# Patient Record
Sex: FEMALE | HISPANIC OR LATINO
[De-identification: names, ages, dates, MRNs, and addresses within clinical notes are randomized per-mention and may not be internally consistent; named-entity substitution may affect disease eponyms.]

---

## 2017-06-06 ENCOUNTER — CHARTING TRANS (OUTPATIENT)
Dept: OTHER | Age: 27
End: 2017-06-06

## 2017-06-06 ENCOUNTER — LAB SERVICES (OUTPATIENT)
Dept: OTHER | Age: 27
End: 2017-06-06

## 2017-06-06 LAB — RAPID STREP GROUP A: POSITIVE

## 2018-05-23 ENCOUNTER — CHARTING TRANS (OUTPATIENT)
Dept: OTHER | Age: 28
End: 2018-05-23

## 2018-05-23 ENCOUNTER — LAB SERVICES (OUTPATIENT)
Dept: OTHER | Age: 28
End: 2018-05-23

## 2018-05-23 LAB — RAPID STREP GROUP A: POSITIVE

## 2018-07-07 ENCOUNTER — CHARTING TRANS (OUTPATIENT)
Dept: OTHER | Age: 28
End: 2018-07-07

## 2018-07-07 ENCOUNTER — LAB SERVICES (OUTPATIENT)
Dept: OTHER | Age: 28
End: 2018-07-07

## 2018-07-07 LAB — RAPID STREP GROUP A: NORMAL

## 2018-07-10 LAB — CULTURE STREP GRP A (STTH) HL: NORMAL

## 2018-10-31 VITALS
TEMPERATURE: 98.8 F | WEIGHT: 123.31 LBS | RESPIRATION RATE: 20 BRPM | OXYGEN SATURATION: 99 % | HEART RATE: 82 BPM | DIASTOLIC BLOOD PRESSURE: 60 MMHG | SYSTOLIC BLOOD PRESSURE: 100 MMHG

## 2018-11-01 VITALS
TEMPERATURE: 99.2 F | RESPIRATION RATE: 18 BRPM | SYSTOLIC BLOOD PRESSURE: 100 MMHG | OXYGEN SATURATION: 99 % | WEIGHT: 120 LBS | HEIGHT: 65 IN | DIASTOLIC BLOOD PRESSURE: 78 MMHG | HEART RATE: 78 BPM | BODY MASS INDEX: 19.99 KG/M2

## 2021-01-05 ENCOUNTER — OFFICE VISIT (OUTPATIENT)
Dept: INTEGRATIVE MEDICINE | Facility: CLINIC | Age: 31
End: 2021-01-05

## 2021-01-05 VITALS
WEIGHT: 126.38 LBS | SYSTOLIC BLOOD PRESSURE: 92 MMHG | BODY MASS INDEX: 21.05 KG/M2 | DIASTOLIC BLOOD PRESSURE: 62 MMHG | HEIGHT: 65 IN | HEART RATE: 64 BPM | OXYGEN SATURATION: 99 %

## 2021-01-05 DIAGNOSIS — Z00.00 ROUTINE MEDICAL EXAM: Primary | ICD-10-CM

## 2021-01-05 PROCEDURE — 99385 PREV VISIT NEW AGE 18-39: CPT | Performed by: FAMILY MEDICINE

## 2021-01-05 PROCEDURE — 3074F SYST BP LT 130 MM HG: CPT | Performed by: FAMILY MEDICINE

## 2021-01-05 PROCEDURE — 3078F DIAST BP <80 MM HG: CPT | Performed by: FAMILY MEDICINE

## 2021-01-05 PROCEDURE — 3008F BODY MASS INDEX DOCD: CPT | Performed by: FAMILY MEDICINE

## 2021-01-05 NOTE — PATIENT INSTRUCTIONS
I have complete iva in the body's ability to heal and transform. The products and items listed below (the “Products”)  and their claims have not been evaluated by the Food and Drug Administration.  Dietary products are not intended to treat, prevent, m

## 2021-01-05 NOTE — ADDENDUM NOTE
Addended by: Marianela Euceda on: 1/5/2021 09:35 AM     Modules accepted: Orders, Level of Service Patient returned call and informed of below results and recommendations per Marycarmen DOVER NP.  Patient states she is feeling good now, denies cramping.  Patient encouraged to call with any additional questions or concerns. She verbalized understanding and appreciation for follow up call.  Encounter closed.

## 2021-01-05 NOTE — PROGRESS NOTES
Magnus Waller is a 26year old female. Patient presents with:  New Patient  Physical: w/pap and breast exam      HPI:     Living with boyfriend and son. Diet - meat, salads once per week, cooking at home, eating out less at home.   Coffee - 1 cup  Soda - 1 history. CURRENT MEDICATIONS:     No current outpatient medications on file.        SOCIAL HISTORY:   Social History    Socioeconomic History      Marital status: Single      Spouse name: Not on file      Number of children: Not on file      Years of edu 5' 5\" (1.651 m)       Physical Exam   Constitutional: She is oriented to person, place, and time and well-developed, well-nourished, and in no distress. No distress. HENT:   Head: Normocephalic and atraumatic.    Right Ear: External ear normal.   Left Ea products and items listed below (the “Products”)  and their claims have not been evaluated by the Food and Drug Administration. Dietary products are not intended to treat, prevent, mitigate or cure disease.  Ultimately, you must draw your own conclusion as

## 2021-01-09 ENCOUNTER — LAB ENCOUNTER (OUTPATIENT)
Dept: LAB | Facility: HOSPITAL | Age: 31
End: 2021-01-09
Attending: FAMILY MEDICINE
Payer: COMMERCIAL

## 2021-01-09 DIAGNOSIS — Z00.00 ROUTINE MEDICAL EXAM: ICD-10-CM

## 2021-01-09 LAB
ALBUMIN SERPL-MCNC: 4.1 G/DL (ref 3.4–5)
ALBUMIN/GLOB SERPL: 1.1 {RATIO} (ref 1–2)
ALP LIVER SERPL-CCNC: 77 U/L
ALT SERPL-CCNC: 19 U/L
ANION GAP SERPL CALC-SCNC: 4 MMOL/L (ref 0–18)
AST SERPL-CCNC: 16 U/L (ref 15–37)
BASOPHILS # BLD AUTO: 0.02 X10(3) UL (ref 0–0.2)
BASOPHILS NFR BLD AUTO: 0.4 %
BILIRUB SERPL-MCNC: 0.5 MG/DL (ref 0.1–2)
BUN BLD-MCNC: 14 MG/DL (ref 7–18)
BUN/CREAT SERPL: 19.4 (ref 10–20)
CALCIUM BLD-MCNC: 9.2 MG/DL (ref 8.5–10.1)
CHLORIDE SERPL-SCNC: 109 MMOL/L (ref 98–112)
CHOLEST SMN-MCNC: 146 MG/DL (ref ?–200)
CO2 SERPL-SCNC: 26 MMOL/L (ref 21–32)
CREAT BLD-MCNC: 0.72 MG/DL
DEPRECATED RDW RBC AUTO: 38.7 FL (ref 35.1–46.3)
EOSINOPHIL # BLD AUTO: 0.06 X10(3) UL (ref 0–0.7)
EOSINOPHIL NFR BLD AUTO: 1.1 %
ERYTHROCYTE [DISTWIDTH] IN BLOOD BY AUTOMATED COUNT: 12 % (ref 11–15)
EST. AVERAGE GLUCOSE BLD GHB EST-MCNC: 103 MG/DL (ref 68–126)
GLOBULIN PLAS-MCNC: 3.9 G/DL (ref 2.8–4.4)
GLUCOSE BLD-MCNC: 90 MG/DL (ref 70–99)
HBA1C MFR BLD HPLC: 5.2 % (ref ?–5.7)
HCT VFR BLD AUTO: 42.3 %
HDLC SERPL-MCNC: 51 MG/DL (ref 40–59)
HGB BLD-MCNC: 13.5 G/DL
IMM GRANULOCYTES # BLD AUTO: 0.02 X10(3) UL (ref 0–1)
IMM GRANULOCYTES NFR BLD: 0.4 %
LDLC SERPL CALC-MCNC: 82 MG/DL (ref ?–100)
LYMPHOCYTES # BLD AUTO: 2.15 X10(3) UL (ref 1–4)
LYMPHOCYTES NFR BLD AUTO: 37.9 %
M PROTEIN MFR SERPL ELPH: 8 G/DL (ref 6.4–8.2)
MCH RBC QN AUTO: 28.1 PG (ref 26–34)
MCHC RBC AUTO-ENTMCNC: 31.9 G/DL (ref 31–37)
MCV RBC AUTO: 87.9 FL
MONOCYTES # BLD AUTO: 0.48 X10(3) UL (ref 0.1–1)
MONOCYTES NFR BLD AUTO: 8.5 %
NEUTROPHILS # BLD AUTO: 2.95 X10 (3) UL (ref 1.5–7.7)
NEUTROPHILS # BLD AUTO: 2.95 X10(3) UL (ref 1.5–7.7)
NEUTROPHILS NFR BLD AUTO: 51.7 %
NONHDLC SERPL-MCNC: 95 MG/DL (ref ?–130)
OSMOLALITY SERPL CALC.SUM OF ELEC: 288 MOSM/KG (ref 275–295)
PATIENT FASTING Y/N/NP: YES
PATIENT FASTING Y/N/NP: YES
PLATELET # BLD AUTO: 277 10(3)UL (ref 150–450)
POTASSIUM SERPL-SCNC: 4.3 MMOL/L (ref 3.5–5.1)
RBC # BLD AUTO: 4.81 X10(6)UL
SODIUM SERPL-SCNC: 139 MMOL/L (ref 136–145)
TRIGL SERPL-MCNC: 65 MG/DL (ref 30–149)
TSI SER-ACNC: 1.29 MIU/ML (ref 0.36–3.74)
VLDLC SERPL CALC-MCNC: 13 MG/DL (ref 0–30)
WBC # BLD AUTO: 5.7 X10(3) UL (ref 4–11)

## 2021-01-09 PROCEDURE — 80061 LIPID PANEL: CPT

## 2021-01-09 PROCEDURE — 83036 HEMOGLOBIN GLYCOSYLATED A1C: CPT

## 2021-01-09 PROCEDURE — 85025 COMPLETE CBC W/AUTO DIFF WBC: CPT

## 2021-01-09 PROCEDURE — 36415 COLL VENOUS BLD VENIPUNCTURE: CPT

## 2021-01-09 PROCEDURE — 82306 VITAMIN D 25 HYDROXY: CPT

## 2021-01-09 PROCEDURE — 84443 ASSAY THYROID STIM HORMONE: CPT

## 2021-01-09 PROCEDURE — 80053 COMPREHEN METABOLIC PANEL: CPT

## 2021-01-11 LAB — 25(OH)D3 SERPL-MCNC: 13.7 NG/ML (ref 30–100)

## 2021-06-21 ENCOUNTER — OFFICE VISIT (OUTPATIENT)
Dept: FAMILY MEDICINE CLINIC | Facility: CLINIC | Age: 31
End: 2021-06-21

## 2021-06-21 VITALS
SYSTOLIC BLOOD PRESSURE: 90 MMHG | HEART RATE: 70 BPM | DIASTOLIC BLOOD PRESSURE: 62 MMHG | HEIGHT: 65 IN | BODY MASS INDEX: 20.99 KG/M2 | WEIGHT: 126 LBS

## 2021-06-21 DIAGNOSIS — Z12.4 ROUTINE CERVICAL SMEAR: Primary | ICD-10-CM

## 2021-06-21 DIAGNOSIS — Z31.69 PRE-CONCEPTION COUNSELING: ICD-10-CM

## 2021-06-21 PROCEDURE — 3008F BODY MASS INDEX DOCD: CPT | Performed by: NURSE PRACTITIONER

## 2021-06-21 PROCEDURE — 3074F SYST BP LT 130 MM HG: CPT | Performed by: NURSE PRACTITIONER

## 2021-06-21 PROCEDURE — 3078F DIAST BP <80 MM HG: CPT | Performed by: NURSE PRACTITIONER

## 2021-06-21 PROCEDURE — 88175 CYTOPATH C/V AUTO FLUID REDO: CPT | Performed by: NURSE PRACTITIONER

## 2021-06-21 PROCEDURE — 99213 OFFICE O/P EST LOW 20 MIN: CPT | Performed by: NURSE PRACTITIONER

## 2021-06-21 PROCEDURE — 87624 HPV HI-RISK TYP POOLED RSLT: CPT | Performed by: NURSE PRACTITIONER

## 2021-06-21 NOTE — PATIENT INSTRUCTIONS
The Range of Pap Test Results  When your Pap test is sent to the lab, the lab studies your cell samples and reports any abnormal cell changes. Your healthcare provider can discuss these changes with you.  In some cases, an abnormal Pap test is due to an i destroy or remove problem cells. (Reported as low-grade PRIMO or CHRISTOFER 1.)  · Moderate to severe dysplasia. Cells show precancerous changes. Or noninvasive cancer (carcinoma in situ) may be present. Treatment to destroy or remove problem cells is likely.  (Repo micrograms or a prenatal vitamin daily.   · Eat a healthy, well-balanced diet. · Exercise 3 or more times a week and at least 150 minutes weekly. · Get within 15 pounds of your ideal weight.   The first weeks of pregnancy are the most important time in a

## 2021-06-21 NOTE — PROGRESS NOTES
Chief Complaint:   Patient presents with:  Gyn Exam      HPI:   This is a 33year old female coming in for pap smear. Patient reports last pap was in 2017, normal per patient. Denies hx of abnormal pap.  LMP in early June, periods regular, no bothersome sym HCT 42.3 35.0 - 48.0 %    MCV 87.9 80.0 - 100.0 fL    MCH 28.1 26.0 - 34.0 pg    MCHC 31.9 31.0 - 37.0 g/dL    RDW-SD 38.7 35.1 - 46.3 fL    RDW 12.0 11.0 - 15.0 %    .0 150.0 - 450.0 10(3)uL    Neutrophil Absolute Prelim 2.95 1.50 - 7.70 x10 (3) uL 01/05/2021 (Exact Date)   BMI 20.97 kg/m²  Estimated body mass index is 20.97 kg/m² as calculated from the following:    Height as of this encounter: 5' 5\" (1.651 m). Weight as of this encounter: 126 lb (57.2 kg). Vital signs reviewed. Appears stated

## 2021-09-28 ENCOUNTER — OFFICE VISIT (OUTPATIENT)
Dept: INTEGRATIVE MEDICINE | Facility: CLINIC | Age: 31
End: 2021-09-28

## 2021-09-28 VITALS
DIASTOLIC BLOOD PRESSURE: 68 MMHG | SYSTOLIC BLOOD PRESSURE: 106 MMHG | HEIGHT: 65 IN | WEIGHT: 121.81 LBS | BODY MASS INDEX: 20.29 KG/M2 | OXYGEN SATURATION: 99 % | HEART RATE: 88 BPM

## 2021-09-28 DIAGNOSIS — B37.3 CANDIDA VAGINITIS: Primary | ICD-10-CM

## 2021-09-28 PROCEDURE — 3008F BODY MASS INDEX DOCD: CPT | Performed by: FAMILY MEDICINE

## 2021-09-28 PROCEDURE — 99213 OFFICE O/P EST LOW 20 MIN: CPT | Performed by: FAMILY MEDICINE

## 2021-09-28 PROCEDURE — 3078F DIAST BP <80 MM HG: CPT | Performed by: FAMILY MEDICINE

## 2021-09-28 PROCEDURE — 3074F SYST BP LT 130 MM HG: CPT | Performed by: FAMILY MEDICINE

## 2021-09-28 RX ORDER — FLUCONAZOLE 150 MG/1
TABLET ORAL
Qty: 2 TABLET | Refills: 0 | Status: SHIPPED | OUTPATIENT
Start: 2021-09-28 | End: 2021-12-02

## 2021-09-28 NOTE — PATIENT INSTRUCTIONS
I have complete iva in the body's ability to heal and transform. The products and items listed below (the “Products”)  and their claims have not been evaluated by the Food and Drug Administration.  Dietary products are not intended to treat, prevent, m coffee or less per day), 25 g of refined sugar per day or less. Eat dark chocolate when craving something sweet. ½ of their body weight in water per day or at least 70-80 oz of water per day.   Emphasize In moderation Avoid   NON-STARCHY VEGETABLES  Artich tea  Coffee  Diet & regular soda  Energy drinks  Fruit juices   Emphasize In Moderation Avoid   Vanessa  Oregano Paprika  Rosemary  Salt  Thyme  Turmeric  HEALTHY FATS & OILS  Coconut oil (virgin)  Flax oil  Olive oil  Sesame oil  NO-SUGAR SWEETENERS  Nathalia Mend

## 2021-09-28 NOTE — PROGRESS NOTES
Obinna Morin is a 33year old female. Patient presents with: Integrative Medicine Consult: 9 mo f/u      HPI:     Treated for BV in June 2021. Developed itching and white dc after metronidazole tx. Used monistat and her symptoms resolved.      Then her Living Expenses: Not on file  Food Insecurity:       Worried About [employer] in the Last Year: Not on file      Ran Out of Food in the Last Year: Not on file  Transportation Needs:       Lack of Transportation (Medical):  Not on file      Lack of nerve deficit. Gait: Gait is intact.    Psychiatric:         Mood and Affect: Affect normal.         ASSESSMENT AND PLAN:     Office Visit on 06/21/2021   Component Date Value Ref Range Status   • HPV High Risk 06/21/2021 Negative  Negative Final   • H 06/21/2021 10:17 AM          Ordering Location:     [address]  Received:            06/22/2021 10:38 AM                                 Mike Muñoz, Products, including, but not limited to its efficacy, benefits or outcomes. Patient agrees to contact his/her healthcare professional and stop use of Products should any reactions arise. Recommendations:     To support normal vaginal sun ongoing: (non-GMO)  Peas  Potatoes  Pumpkin  Yams  FRUITS  Apples  Apricots  Blackberries  Blueberries  Grapefruit  Oranges  Peach  Pears  Strawberries  Watermelon  GRAINS & PSEUDO-GRAINS  Amaranth  Black rice  Brown rice  Wild rice  RED MEATS  Beef  [employer] Seaweed  • Roasted Kale  • Fruit- in moderation  • Raw cut up veggies (with hummus)        Return if symptoms worsen or fail to improve. Patient affirmed understanding of plan and all questions were answered.      Samina Jovel,

## 2021-10-04 ENCOUNTER — TELEPHONE (OUTPATIENT)
Dept: OBGYN CLINIC | Facility: CLINIC | Age: 31
End: 2021-10-04

## 2021-10-04 NOTE — TELEPHONE ENCOUNTER
Pt calling to report +HPT and LMP of 8/28 and reports cycles every 28-29 days. Pt is on PNV. Pt informed that we have male and female providers and she will see all of them, throughout care. Pt informed her first appt is OBN and offered PC or in person.  Pt

## 2021-10-16 ENCOUNTER — NURSE ONLY (OUTPATIENT)
Dept: OBGYN CLINIC | Facility: CLINIC | Age: 31
End: 2021-10-16

## 2021-10-16 VITALS — BODY MASS INDEX: 20 KG/M2 | WEIGHT: 122 LBS

## 2021-10-16 DIAGNOSIS — Z32.00 PREGNANCY EXAMINATION OR TEST, PREGNANCY UNCONFIRMED: Primary | ICD-10-CM

## 2021-10-16 DIAGNOSIS — Z34.91 FIRST TRIMESTER PREGNANCY: ICD-10-CM

## 2021-10-16 RX ORDER — CHOLECALCIFEROL (VITAMIN D3) 25 MCG
1 TABLET,CHEWABLE ORAL DAILY
COMMUNITY

## 2021-10-16 NOTE — PROGRESS NOTES
Pt seen for OBN-PC appt today with no complaints. Normal PN labs, Hep C and qual labs ordered. Pt advised all labs must be completed and resulted prior to MD appt or appt will be cancelled.  Pt accepted first available appt on 11/1 with GALA. Pt advised ca family with:  Patient's age 28 years or older as of estimated date of delivery: No   Thalassemia ([facility], [facility], [facility], or  background):  MCV less than 80: No   Neural tube defect (Meningomyelocele, Spina bifida, or Anencephaly): No   Congenit

## 2021-10-27 ENCOUNTER — TELEPHONE (OUTPATIENT)
Dept: OBGYN CLINIC | Facility: CLINIC | Age: 31
End: 2021-10-27

## 2021-10-27 NOTE — TELEPHONE ENCOUNTER
Reminder message sent to pt to get lab work done 2-3 days prior to and if labs are not resulted by appt day she will be asked to r/s

## 2021-10-29 ENCOUNTER — LAB ENCOUNTER (OUTPATIENT)
Dept: LAB | Facility: HOSPITAL | Age: 31
End: 2021-10-29
Attending: OBSTETRICS & GYNECOLOGY
Payer: COMMERCIAL

## 2021-10-29 DIAGNOSIS — Z34.91 FIRST TRIMESTER PREGNANCY: ICD-10-CM

## 2021-10-29 DIAGNOSIS — Z32.00 PREGNANCY EXAMINATION OR TEST, PREGNANCY UNCONFIRMED: ICD-10-CM

## 2021-10-29 PROCEDURE — 86780 TREPONEMA PALLIDUM: CPT

## 2021-10-29 PROCEDURE — 36415 COLL VENOUS BLD VENIPUNCTURE: CPT

## 2021-10-29 PROCEDURE — 84703 CHORIONIC GONADOTROPIN ASSAY: CPT

## 2021-10-29 PROCEDURE — 87086 URINE CULTURE/COLONY COUNT: CPT

## 2021-10-29 PROCEDURE — 86803 HEPATITIS C AB TEST: CPT

## 2021-10-29 PROCEDURE — 87389 HIV-1 AG W/HIV-1&-2 AB AG IA: CPT

## 2021-10-29 PROCEDURE — 86901 BLOOD TYPING SEROLOGIC RH(D): CPT

## 2021-10-29 PROCEDURE — 86850 RBC ANTIBODY SCREEN: CPT

## 2021-10-29 PROCEDURE — 86900 BLOOD TYPING SEROLOGIC ABO: CPT

## 2021-10-29 PROCEDURE — 86762 RUBELLA ANTIBODY: CPT

## 2021-10-29 PROCEDURE — 87340 HEPATITIS B SURFACE AG IA: CPT

## 2021-10-29 PROCEDURE — 85025 COMPLETE CBC W/AUTO DIFF WBC: CPT

## 2021-11-01 ENCOUNTER — INITIAL PRENATAL (OUTPATIENT)
Dept: OBGYN CLINIC | Facility: CLINIC | Age: 31
End: 2021-11-01

## 2021-11-01 VITALS — BODY MASS INDEX: 21 KG/M2 | SYSTOLIC BLOOD PRESSURE: 104 MMHG | WEIGHT: 123.38 LBS | DIASTOLIC BLOOD PRESSURE: 62 MMHG

## 2021-11-01 DIAGNOSIS — Z34.81 ENCOUNTER FOR SUPERVISION OF OTHER NORMAL PREGNANCY IN FIRST TRIMESTER: Primary | ICD-10-CM

## 2021-11-01 PROCEDURE — 81002 URINALYSIS NONAUTO W/O SCOPE: CPT | Performed by: OBSTETRICS & GYNECOLOGY

## 2021-11-01 PROCEDURE — 90686 IIV4 VACC NO PRSV 0.5 ML IM: CPT | Performed by: OBSTETRICS & GYNECOLOGY

## 2021-11-01 PROCEDURE — 3074F SYST BP LT 130 MM HG: CPT | Performed by: OBSTETRICS & GYNECOLOGY

## 2021-11-01 PROCEDURE — 90471 IMMUNIZATION ADMIN: CPT | Performed by: OBSTETRICS & GYNECOLOGY

## 2021-11-01 PROCEDURE — 3078F DIAST BP <80 MM HG: CPT | Performed by: OBSTETRICS & GYNECOLOGY

## 2021-11-01 NOTE — PROGRESS NOTES
Roberth at visit. 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 Been together since teens. 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 Referred by Lora SILVA. They decline FTS. Flu vaccine today. US = dates today. Daily nausea.

## 2021-12-02 ENCOUNTER — ROUTINE PRENATAL (OUTPATIENT)
Dept: OBGYN CLINIC | Facility: CLINIC | Age: 31
End: 2021-12-02

## 2021-12-02 VITALS
WEIGHT: 124.63 LBS | DIASTOLIC BLOOD PRESSURE: 68 MMHG | SYSTOLIC BLOOD PRESSURE: 107 MMHG | HEART RATE: 82 BPM | BODY MASS INDEX: 21 KG/M2

## 2021-12-02 DIAGNOSIS — Z34.81 ENCOUNTER FOR SUPERVISION OF OTHER NORMAL PREGNANCY IN FIRST TRIMESTER: Primary | ICD-10-CM

## 2021-12-02 PROCEDURE — 81002 URINALYSIS NONAUTO W/O SCOPE: CPT | Performed by: OBSTETRICS & GYNECOLOGY

## 2021-12-02 PROCEDURE — 3078F DIAST BP <80 MM HG: CPT | Performed by: OBSTETRICS & GYNECOLOGY

## 2021-12-02 PROCEDURE — 3074F SYST BP LT 130 MM HG: CPT | Performed by: OBSTETRICS & GYNECOLOGY

## 2021-12-27 ENCOUNTER — ROUTINE PRENATAL (OUTPATIENT)
Dept: OBGYN CLINIC | Facility: CLINIC | Age: 31
End: 2021-12-27

## 2021-12-27 VITALS
WEIGHT: 122 LBS | DIASTOLIC BLOOD PRESSURE: 63 MMHG | BODY MASS INDEX: 20 KG/M2 | SYSTOLIC BLOOD PRESSURE: 101 MMHG | HEART RATE: 90 BPM

## 2021-12-27 DIAGNOSIS — Z34.92 ENCOUNTER FOR SUPERVISION OF NORMAL PREGNANCY IN SECOND TRIMESTER, UNSPECIFIED GRAVIDITY: Primary | ICD-10-CM

## 2021-12-27 PROCEDURE — 81002 URINALYSIS NONAUTO W/O SCOPE: CPT | Performed by: OBSTETRICS & GYNECOLOGY

## 2021-12-27 PROCEDURE — 3074F SYST BP LT 130 MM HG: CPT | Performed by: OBSTETRICS & GYNECOLOGY

## 2021-12-27 PROCEDURE — 3078F DIAST BP <80 MM HG: CPT | Performed by: OBSTETRICS & GYNECOLOGY

## 2022-01-18 ENCOUNTER — HOSPITAL ENCOUNTER (OUTPATIENT)
Dept: ULTRASOUND IMAGING | Age: 32
Discharge: HOME | End: 2022-01-18
Attending: OBSTETRICS & GYNECOLOGY
Payer: COMMERCIAL

## 2022-01-18 DIAGNOSIS — Z34.92 ENCOUNTER FOR SUPERVISION OF NORMAL PREGNANCY IN SECOND TRIMESTER, UNSPECIFIED GRAVIDITY: ICD-10-CM

## 2022-01-18 PROCEDURE — 76805 OB US >/= 14 WKS SNGL FETUS: CPT | Performed by: OBSTETRICS & GYNECOLOGY

## 2022-01-25 ENCOUNTER — ROUTINE PRENATAL (OUTPATIENT)
Dept: OBGYN CLINIC | Facility: CLINIC | Age: 32
End: 2022-01-25
Payer: COMMERCIAL

## 2022-01-25 VITALS
DIASTOLIC BLOOD PRESSURE: 65 MMHG | HEART RATE: 77 BPM | BODY MASS INDEX: 21 KG/M2 | SYSTOLIC BLOOD PRESSURE: 103 MMHG | WEIGHT: 128.38 LBS

## 2022-01-25 DIAGNOSIS — Z34.92 ENCOUNTER FOR SUPERVISION OF NORMAL PREGNANCY IN SECOND TRIMESTER, UNSPECIFIED GRAVIDITY: Primary | ICD-10-CM

## 2022-01-25 PROBLEM — O26.899 RH NEGATIVE STATE IN ANTEPARTUM PERIOD (HCC): Status: ACTIVE | Noted: 2022-01-25

## 2022-01-25 PROBLEM — Z67.91 RH NEGATIVE STATE IN ANTEPARTUM PERIOD: Status: ACTIVE | Noted: 2022-01-25

## 2022-01-25 PROBLEM — O26.899 RH NEGATIVE STATE IN ANTEPARTUM PERIOD: Status: ACTIVE | Noted: 2022-01-25

## 2022-01-25 PROBLEM — Z67.91 RH NEGATIVE STATE IN ANTEPARTUM PERIOD (HCC): Status: ACTIVE | Noted: 2022-01-25

## 2022-01-25 LAB
APPEARANCE: CLEAR
BILIRUBIN: NEGATIVE
GLUCOSE (URINE DIPSTICK): NEGATIVE MG/DL
KETONES (URINE DIPSTICK): NEGATIVE MG/DL
MULTISTIX LOT#: ABNORMAL NUMERIC
NITRITE, URINE: NEGATIVE
OCCULT BLOOD: NEGATIVE
PH, URINE: 6.5 (ref 4.5–8)
PROTEIN (URINE DIPSTICK): NEGATIVE MG/DL
SPECIFIC GRAVITY: 1.02 (ref 1–1.03)
URINE-COLOR: YELLOW
UROBILINOGEN,SEMI-QN: 0.2 MG/DL (ref 0–1.9)

## 2022-01-25 PROCEDURE — 81002 URINALYSIS NONAUTO W/O SCOPE: CPT | Performed by: OBSTETRICS & GYNECOLOGY

## 2022-01-25 PROCEDURE — 3074F SYST BP LT 130 MM HG: CPT | Performed by: OBSTETRICS & GYNECOLOGY

## 2022-01-25 PROCEDURE — 3078F DIAST BP <80 MM HG: CPT | Performed by: OBSTETRICS & GYNECOLOGY

## 2022-02-21 ENCOUNTER — ROUTINE PRENATAL (OUTPATIENT)
Dept: OBGYN CLINIC | Facility: CLINIC | Age: 32
End: 2022-02-21
Payer: COMMERCIAL

## 2022-02-21 VITALS
BODY MASS INDEX: 22 KG/M2 | DIASTOLIC BLOOD PRESSURE: 62 MMHG | HEART RATE: 79 BPM | SYSTOLIC BLOOD PRESSURE: 101 MMHG | WEIGHT: 134.63 LBS

## 2022-02-21 DIAGNOSIS — Z34.82 ENCOUNTER FOR SUPERVISION OF OTHER NORMAL PREGNANCY IN SECOND TRIMESTER: Primary | ICD-10-CM

## 2022-02-21 LAB
BILIRUBIN: NEGATIVE
GLUCOSE (URINE DIPSTICK): NEGATIVE MG/DL
KETONES (URINE DIPSTICK): NEGATIVE MG/DL
MULTISTIX EXPIRATION DATE: 3622 DATE
MULTISTIX LOT#: 1027 NUMERIC
NITRITE, URINE: NEGATIVE
OCCULT BLOOD: NEGATIVE
PH, URINE: 6.5 (ref 4.5–8)
SPECIFIC GRAVITY: 1.02 (ref 1–1.03)
UROBILINOGEN,SEMI-QN: 0.2 MG/DL (ref 0–1.9)

## 2022-02-21 PROCEDURE — 81002 URINALYSIS NONAUTO W/O SCOPE: CPT | Performed by: OBSTETRICS & GYNECOLOGY

## 2022-02-21 PROCEDURE — 3074F SYST BP LT 130 MM HG: CPT | Performed by: OBSTETRICS & GYNECOLOGY

## 2022-02-21 PROCEDURE — 3078F DIAST BP <80 MM HG: CPT | Performed by: OBSTETRICS & GYNECOLOGY

## 2022-02-28 ENCOUNTER — TELEPHONE (OUTPATIENT)
Dept: OBGYN CLINIC | Facility: CLINIC | Age: 32
End: 2022-02-28

## 2022-02-28 NOTE — TELEPHONE ENCOUNTER
]Pt stated  she is getting labs done this weekend and thought she needed repeat aboRH and antibody screen since pt will need rhogam. Pt informed she is correct an aboRH and antibody screen labs will be placed now. Pt verbalized understanding.

## 2022-03-05 ENCOUNTER — LAB ENCOUNTER (OUTPATIENT)
Dept: LAB | Facility: HOSPITAL | Age: 32
End: 2022-03-05
Attending: FAMILY MEDICINE
Payer: COMMERCIAL

## 2022-03-05 DIAGNOSIS — Z34.82 ENCOUNTER FOR SUPERVISION OF OTHER NORMAL PREGNANCY IN SECOND TRIMESTER: ICD-10-CM

## 2022-03-05 DIAGNOSIS — Z34.81 ENCOUNTER FOR SUPERVISION OF OTHER NORMAL PREGNANCY IN FIRST TRIMESTER: ICD-10-CM

## 2022-03-05 DIAGNOSIS — Z67.91 BLOOD TYPE, RH NEGATIVE: ICD-10-CM

## 2022-03-05 LAB
ANTIBODY SCREEN: NEGATIVE
DEPRECATED RDW RBC AUTO: 39.9 FL (ref 35.1–46.3)
ERYTHROCYTE [DISTWIDTH] IN BLOOD BY AUTOMATED COUNT: 12.1 % (ref 11–15)
GLUCOSE 1H P GLC SERPL-MCNC: 127 MG/DL
HCT VFR BLD AUTO: 34.9 %
HGB BLD-MCNC: 11.2 G/DL
MCH RBC QN AUTO: 29 PG (ref 26–34)
MCHC RBC AUTO-ENTMCNC: 32.1 G/DL (ref 31–37)
MCV RBC AUTO: 90.4 FL
PLATELET # BLD AUTO: 246 10(3)UL (ref 150–450)
RBC # BLD AUTO: 3.86 X10(6)UL
RH BLOOD TYPE: NEGATIVE
WBC # BLD AUTO: 8.5 X10(3) UL (ref 4–11)

## 2022-03-05 PROCEDURE — 86901 BLOOD TYPING SEROLOGIC RH(D): CPT

## 2022-03-05 PROCEDURE — 86900 BLOOD TYPING SEROLOGIC ABO: CPT

## 2022-03-05 PROCEDURE — 86850 RBC ANTIBODY SCREEN: CPT

## 2022-03-05 PROCEDURE — 36415 COLL VENOUS BLD VENIPUNCTURE: CPT

## 2022-03-05 PROCEDURE — 85027 COMPLETE CBC AUTOMATED: CPT

## 2022-03-05 PROCEDURE — 82950 GLUCOSE TEST: CPT

## 2022-03-14 ENCOUNTER — ROUTINE PRENATAL (OUTPATIENT)
Dept: OBGYN CLINIC | Facility: CLINIC | Age: 32
End: 2022-03-14
Payer: COMMERCIAL

## 2022-03-14 VITALS
BODY MASS INDEX: 23 KG/M2 | SYSTOLIC BLOOD PRESSURE: 103 MMHG | WEIGHT: 136 LBS | HEART RATE: 76 BPM | DIASTOLIC BLOOD PRESSURE: 64 MMHG

## 2022-03-14 DIAGNOSIS — Z67.91 RH NEGATIVE STATUS DURING PREGNANCY IN THIRD TRIMESTER: ICD-10-CM

## 2022-03-14 DIAGNOSIS — Z34.83 ENCOUNTER FOR SUPERVISION OF OTHER NORMAL PREGNANCY IN THIRD TRIMESTER: Primary | ICD-10-CM

## 2022-03-14 DIAGNOSIS — O26.893 RH NEGATIVE STATUS DURING PREGNANCY IN THIRD TRIMESTER: ICD-10-CM

## 2022-03-14 LAB
APPEARANCE: CLEAR
GLUCOSE (URINE DIPSTICK): NEGATIVE MG/DL
MULTISTIX LOT#: NORMAL NUMERIC
NITRITE, URINE: NEGATIVE
URINE-COLOR: YELLOW

## 2022-03-14 PROCEDURE — 96372 THER/PROPH/DIAG INJ SC/IM: CPT | Performed by: OBSTETRICS & GYNECOLOGY

## 2022-03-14 PROCEDURE — 81002 URINALYSIS NONAUTO W/O SCOPE: CPT | Performed by: OBSTETRICS & GYNECOLOGY

## 2022-03-14 PROCEDURE — 3078F DIAST BP <80 MM HG: CPT | Performed by: OBSTETRICS & GYNECOLOGY

## 2022-03-14 PROCEDURE — 3074F SYST BP LT 130 MM HG: CPT | Performed by: OBSTETRICS & GYNECOLOGY

## 2022-03-14 NOTE — PROGRESS NOTES
rhogam administered to pts left upper outer gluteus. pt tolerated injection well. Rhogam card given to pt.

## 2022-03-30 ENCOUNTER — ROUTINE PRENATAL (OUTPATIENT)
Dept: OBGYN CLINIC | Facility: CLINIC | Age: 32
End: 2022-03-30
Payer: COMMERCIAL

## 2022-03-30 VITALS
WEIGHT: 140.63 LBS | BODY MASS INDEX: 23 KG/M2 | HEART RATE: 81 BPM | SYSTOLIC BLOOD PRESSURE: 105 MMHG | DIASTOLIC BLOOD PRESSURE: 64 MMHG

## 2022-03-30 DIAGNOSIS — Z34.83 ENCOUNTER FOR SUPERVISION OF OTHER NORMAL PREGNANCY IN THIRD TRIMESTER: Primary | ICD-10-CM

## 2022-03-30 LAB
APPEARANCE: CLEAR
BILIRUBIN: NEGATIVE
GLUCOSE (URINE DIPSTICK): NEGATIVE MG/DL
KETONES (URINE DIPSTICK): NEGATIVE MG/DL
MULTISTIX LOT#: ABNORMAL NUMERIC
NITRITE, URINE: NEGATIVE
OCCULT BLOOD: NEGATIVE
PH, URINE: 5 (ref 4.5–8)
PROTEIN (URINE DIPSTICK): NEGATIVE MG/DL
SPECIFIC GRAVITY: 1.01 (ref 1–1.03)
URINE-COLOR: YELLOW
UROBILINOGEN,SEMI-QN: 0.2 MG/DL (ref 0–1.9)

## 2022-03-30 PROCEDURE — 3074F SYST BP LT 130 MM HG: CPT | Performed by: OBSTETRICS & GYNECOLOGY

## 2022-03-30 PROCEDURE — 81002 URINALYSIS NONAUTO W/O SCOPE: CPT | Performed by: OBSTETRICS & GYNECOLOGY

## 2022-03-30 PROCEDURE — 90715 TDAP VACCINE 7 YRS/> IM: CPT | Performed by: OBSTETRICS & GYNECOLOGY

## 2022-03-30 PROCEDURE — 3078F DIAST BP <80 MM HG: CPT | Performed by: OBSTETRICS & GYNECOLOGY

## 2022-03-30 PROCEDURE — 90471 IMMUNIZATION ADMIN: CPT | Performed by: OBSTETRICS & GYNECOLOGY

## 2022-04-11 ENCOUNTER — ROUTINE PRENATAL (OUTPATIENT)
Dept: OBGYN CLINIC | Facility: CLINIC | Age: 32
End: 2022-04-11
Payer: COMMERCIAL

## 2022-04-11 VITALS
SYSTOLIC BLOOD PRESSURE: 101 MMHG | DIASTOLIC BLOOD PRESSURE: 65 MMHG | HEART RATE: 82 BPM | BODY MASS INDEX: 24 KG/M2 | WEIGHT: 141.81 LBS

## 2022-04-11 DIAGNOSIS — Z34.83 ENCOUNTER FOR SUPERVISION OF OTHER NORMAL PREGNANCY IN THIRD TRIMESTER: Primary | ICD-10-CM

## 2022-04-11 PROCEDURE — 3078F DIAST BP <80 MM HG: CPT | Performed by: OBSTETRICS & GYNECOLOGY

## 2022-04-11 PROCEDURE — 3074F SYST BP LT 130 MM HG: CPT | Performed by: OBSTETRICS & GYNECOLOGY

## 2022-04-20 ENCOUNTER — TELEPHONE (OUTPATIENT)
Dept: OBGYN CLINIC | Facility: CLINIC | Age: 32
End: 2022-04-20

## 2022-04-20 NOTE — TELEPHONE ENCOUNTER
Message to [facility] EMERGENC[facility] on call now to please advise is pt needs MFM consult for +covid? Pt is 33w3d.

## 2022-04-20 NOTE — TELEPHONE ENCOUNTER
All pts getting Ultrasounds with MFM must have consults    Order placed,mychart sent, MFM number provided

## 2022-04-20 NOTE — TELEPHONE ENCOUNTER
Pt is 33w3d, tested + Covid on Monday 4/18 with home test. Also tested Covid + on 4/19 at work ([address]). Reports symptoms started on 4/17, having scratchy throat, body aches, chills, cough, runny nose and temp 100.1. Denies difficulty breathing and SOB. Reports her son was exposed at school last week and son also tested positive this week. BMI =20. Advised pt on quarantine, comfort measures and advised okay to take Tylenol PRN as directed on package. Pt has next PN appt on 4/28, advised okay to keep appt as is because will be out of 10 day quarantine. Instructed to call clinic if still having symptoms the day before appt. Pt will forward copy of +covid test via e-Tag. Message to [facility] on call to ask if pt needs MFM consult?

## 2022-04-21 NOTE — TELEPHONE ENCOUNTER
Spoke to pt. Aware growth order has been placed,provided MFM number to schedule appt.  Aware must call now, since they book up quickly due to them being specialty

## 2022-04-28 ENCOUNTER — ROUTINE PRENATAL (OUTPATIENT)
Dept: OBGYN CLINIC | Facility: CLINIC | Age: 32
End: 2022-04-28
Payer: COMMERCIAL

## 2022-04-28 VITALS
BODY MASS INDEX: 24 KG/M2 | WEIGHT: 143.19 LBS | HEART RATE: 89 BPM | DIASTOLIC BLOOD PRESSURE: 64 MMHG | SYSTOLIC BLOOD PRESSURE: 102 MMHG

## 2022-04-28 DIAGNOSIS — Z34.83 ENCOUNTER FOR SUPERVISION OF OTHER NORMAL PREGNANCY IN THIRD TRIMESTER: Primary | ICD-10-CM

## 2022-04-28 PROBLEM — U07.1 COVID-19 AFFECTING PREGNANCY IN THIRD TRIMESTER: Status: ACTIVE | Noted: 2022-04-28

## 2022-04-28 PROBLEM — U07.1 COVID-19 AFFECTING PREGNANCY IN THIRD TRIMESTER (HCC): Status: ACTIVE | Noted: 2022-04-28

## 2022-04-28 PROBLEM — O98.513 COVID-19 AFFECTING PREGNANCY IN THIRD TRIMESTER: Status: ACTIVE | Noted: 2022-04-28

## 2022-04-28 PROBLEM — O98.513 COVID-19 AFFECTING PREGNANCY IN THIRD TRIMESTER (HCC): Status: ACTIVE | Noted: 2022-04-28

## 2022-04-28 LAB
APPEARANCE: CLEAR
BILIRUBIN: NEGATIVE
GLUCOSE (URINE DIPSTICK): NEGATIVE MG/DL
KETONES (URINE DIPSTICK): NEGATIVE MG/DL
MULTISTIX LOT#: ABNORMAL NUMERIC
NITRITE, URINE: NEGATIVE
OCCULT BLOOD: NEGATIVE
PH, URINE: 6 (ref 4.5–8)
PROTEIN (URINE DIPSTICK): NEGATIVE MG/DL
SPECIFIC GRAVITY: 1.03 (ref 1–1.03)
URINE-COLOR: YELLOW
UROBILINOGEN,SEMI-QN: 0.2 MG/DL (ref 0–1.9)

## 2022-04-28 PROCEDURE — 3074F SYST BP LT 130 MM HG: CPT | Performed by: OBSTETRICS & GYNECOLOGY

## 2022-04-28 PROCEDURE — 81002 URINALYSIS NONAUTO W/O SCOPE: CPT | Performed by: OBSTETRICS & GYNECOLOGY

## 2022-04-28 PROCEDURE — 3078F DIAST BP <80 MM HG: CPT | Performed by: OBSTETRICS & GYNECOLOGY

## 2022-05-10 ENCOUNTER — LAB ENCOUNTER (OUTPATIENT)
Dept: LAB | Facility: HOSPITAL | Age: 32
End: 2022-05-10
Attending: OBSTETRICS & GYNECOLOGY
Payer: COMMERCIAL

## 2022-05-10 DIAGNOSIS — Z34.83 ENCOUNTER FOR SUPERVISION OF OTHER NORMAL PREGNANCY IN THIRD TRIMESTER: ICD-10-CM

## 2022-05-10 LAB
DEPRECATED RDW RBC AUTO: 38.5 FL (ref 35.1–46.3)
ERYTHROCYTE [DISTWIDTH] IN BLOOD BY AUTOMATED COUNT: 12 % (ref 11–15)
HCT VFR BLD AUTO: 35.8 %
HGB BLD-MCNC: 11.2 G/DL
MCH RBC QN AUTO: 27.9 PG (ref 26–34)
MCHC RBC AUTO-ENTMCNC: 31.3 G/DL (ref 31–37)
MCV RBC AUTO: 89.1 FL
PLATELET # BLD AUTO: 253 10(3)UL (ref 150–450)
RBC # BLD AUTO: 4.02 X10(6)UL
WBC # BLD AUTO: 10.4 X10(3) UL (ref 4–11)

## 2022-05-10 PROCEDURE — 86780 TREPONEMA PALLIDUM: CPT

## 2022-05-10 PROCEDURE — 85027 COMPLETE CBC AUTOMATED: CPT

## 2022-05-10 PROCEDURE — 87389 HIV-1 AG W/HIV-1&-2 AB AG IA: CPT

## 2022-05-10 PROCEDURE — 36415 COLL VENOUS BLD VENIPUNCTURE: CPT

## 2022-05-11 LAB — T PALLIDUM AB SER QL: NEGATIVE

## 2022-05-13 ENCOUNTER — ROUTINE PRENATAL (OUTPATIENT)
Dept: OBGYN CLINIC | Facility: CLINIC | Age: 32
End: 2022-05-13
Payer: COMMERCIAL

## 2022-05-13 VITALS
SYSTOLIC BLOOD PRESSURE: 101 MMHG | HEART RATE: 76 BPM | BODY MASS INDEX: 24 KG/M2 | WEIGHT: 147 LBS | DIASTOLIC BLOOD PRESSURE: 62 MMHG

## 2022-05-13 DIAGNOSIS — Z34.93 ENCOUNTER FOR SUPERVISION OF NORMAL PREGNANCY IN THIRD TRIMESTER, UNSPECIFIED GRAVIDITY: Primary | ICD-10-CM

## 2022-05-13 PROCEDURE — 3078F DIAST BP <80 MM HG: CPT | Performed by: OBSTETRICS & GYNECOLOGY

## 2022-05-13 PROCEDURE — 3074F SYST BP LT 130 MM HG: CPT | Performed by: OBSTETRICS & GYNECOLOGY

## 2022-05-14 LAB — GROUP B STREP BY PCR FOR PCR OVT: NEGATIVE

## 2022-05-16 ENCOUNTER — TELEPHONE (OUTPATIENT)
Dept: OBGYN CLINIC | Facility: CLINIC | Age: 32
End: 2022-05-16

## 2022-05-16 NOTE — TELEPHONE ENCOUNTER
Pt had an appt on 5/13 with GALA.  States after her visit she did her urine sample. She states she gave it to an MA or RN. Pt states the sample was labeled. It was not the MA that roomed her. Pt states that person said they would give the sample to GALA's team.  Results from the urine dip are not in pts chart. Pt would like results from urine dip. MA that worked with GALA on Friday is not here today. Message to Supervisor.

## 2022-05-17 ENCOUNTER — HOSPITAL ENCOUNTER (OUTPATIENT)
Dept: PERINATAL CARE | Facility: HOSPITAL | Age: 32
Discharge: HOME | End: 2022-05-17
Attending: OBSTETRICS & GYNECOLOGY
Payer: COMMERCIAL

## 2022-05-17 VITALS
BODY MASS INDEX: 24 KG/M2 | WEIGHT: 147 LBS | DIASTOLIC BLOOD PRESSURE: 53 MMHG | HEART RATE: 72 BPM | SYSTOLIC BLOOD PRESSURE: 103 MMHG

## 2022-05-17 DIAGNOSIS — O98.513 COVID-19 AFFECTING PREGNANCY IN THIRD TRIMESTER: Primary | ICD-10-CM

## 2022-05-17 DIAGNOSIS — O98.513 COVID-19 AFFECTING PREGNANCY IN THIRD TRIMESTER: ICD-10-CM

## 2022-05-17 DIAGNOSIS — U07.1 COVID-19 AFFECTING PREGNANCY IN THIRD TRIMESTER: Primary | ICD-10-CM

## 2022-05-17 DIAGNOSIS — U07.1 COVID-19 AFFECTING PREGNANCY IN THIRD TRIMESTER: ICD-10-CM

## 2022-05-17 PROCEDURE — 76819 FETAL BIOPHYS PROFIL W/O NST: CPT

## 2022-05-17 PROCEDURE — 76816 OB US FOLLOW-UP PER FETUS: CPT | Performed by: OBSTETRICS & GYNECOLOGY

## 2022-05-20 ENCOUNTER — ROUTINE PRENATAL (OUTPATIENT)
Dept: OBGYN CLINIC | Facility: CLINIC | Age: 32
End: 2022-05-20
Payer: COMMERCIAL

## 2022-05-20 VITALS
DIASTOLIC BLOOD PRESSURE: 58 MMHG | BODY MASS INDEX: 24 KG/M2 | SYSTOLIC BLOOD PRESSURE: 101 MMHG | WEIGHT: 145.38 LBS | HEART RATE: 65 BPM

## 2022-05-20 DIAGNOSIS — Z34.83 ENCOUNTER FOR SUPERVISION OF OTHER NORMAL PREGNANCY IN THIRD TRIMESTER: Primary | ICD-10-CM

## 2022-05-20 LAB
APPEARANCE: CLEAR
BILIRUBIN: NEGATIVE
GLUCOSE (URINE DIPSTICK): NEGATIVE MG/DL
KETONES (URINE DIPSTICK): NEGATIVE MG/DL
LEUKOCYTES: NEGATIVE
MULTISTIX EXPIRATION DATE: 6 6 22 DATE
MULTISTIX LOT#: NORMAL NUMERIC
NITRITE, URINE: NEGATIVE
OCCULT BLOOD: NEGATIVE
PH, URINE: 6 (ref 4.5–8)
SPECIFIC GRAVITY: 1.03 (ref 1–1.03)
URINE-COLOR: YELLOW
UROBILINOGEN,SEMI-QN: 0.2 MG/DL (ref 0–1.9)

## 2022-05-20 PROCEDURE — 3074F SYST BP LT 130 MM HG: CPT | Performed by: OBSTETRICS & GYNECOLOGY

## 2022-05-20 PROCEDURE — 3078F DIAST BP <80 MM HG: CPT | Performed by: OBSTETRICS & GYNECOLOGY

## 2022-05-20 PROCEDURE — 81002 URINALYSIS NONAUTO W/O SCOPE: CPT | Performed by: OBSTETRICS & GYNECOLOGY

## 2022-05-27 ENCOUNTER — ROUTINE PRENATAL (OUTPATIENT)
Dept: OBGYN CLINIC | Facility: CLINIC | Age: 32
End: 2022-05-27
Payer: COMMERCIAL

## 2022-05-27 VITALS
SYSTOLIC BLOOD PRESSURE: 101 MMHG | DIASTOLIC BLOOD PRESSURE: 63 MMHG | HEART RATE: 76 BPM | WEIGHT: 145 LBS | BODY MASS INDEX: 24 KG/M2

## 2022-05-27 DIAGNOSIS — Z34.83 ENCOUNTER FOR SUPERVISION OF OTHER NORMAL PREGNANCY IN THIRD TRIMESTER: Primary | ICD-10-CM

## 2022-05-27 LAB
GLUCOSE (URINE DIPSTICK): NEGATIVE MG/DL
KETONES (URINE DIPSTICK): 15 MG/DL
MULTISTIX LOT#: ABNORMAL NUMERIC
NITRITE, URINE: NEGATIVE
URINE-COLOR: YELLOW

## 2022-05-27 PROCEDURE — 3078F DIAST BP <80 MM HG: CPT | Performed by: OBSTETRICS & GYNECOLOGY

## 2022-05-27 PROCEDURE — 76815 OB US LIMITED FETUS(S): CPT | Performed by: OBSTETRICS & GYNECOLOGY

## 2022-05-27 PROCEDURE — 3074F SYST BP LT 130 MM HG: CPT | Performed by: OBSTETRICS & GYNECOLOGY

## 2022-05-27 PROCEDURE — 81002 URINALYSIS NONAUTO W/O SCOPE: CPT | Performed by: OBSTETRICS & GYNECOLOGY

## 2022-06-01 ENCOUNTER — TELEPHONE (OUTPATIENT)
Dept: OBGYN CLINIC | Facility: CLINIC | Age: 32
End: 2022-06-01

## 2022-06-01 ENCOUNTER — ROUTINE PRENATAL (OUTPATIENT)
Dept: OBGYN CLINIC | Facility: CLINIC | Age: 32
End: 2022-06-01
Payer: COMMERCIAL

## 2022-06-01 VITALS
DIASTOLIC BLOOD PRESSURE: 69 MMHG | SYSTOLIC BLOOD PRESSURE: 106 MMHG | WEIGHT: 148.19 LBS | HEART RATE: 87 BPM | BODY MASS INDEX: 25 KG/M2

## 2022-06-01 DIAGNOSIS — Z34.91 ENCOUNTER FOR SUPERVISION OF NORMAL PREGNANCY IN FIRST TRIMESTER, UNSPECIFIED GRAVIDITY: Primary | ICD-10-CM

## 2022-06-01 LAB
APPEARANCE: CLEAR
BILIRUBIN: NEGATIVE
GLUCOSE (URINE DIPSTICK): NEGATIVE MG/DL
KETONES (URINE DIPSTICK): NEGATIVE MG/DL
LEUKOCYTES: NEGATIVE
MULTISTIX LOT#: NORMAL NUMERIC
NITRITE, URINE: NEGATIVE
OCCULT BLOOD: NEGATIVE
PH, URINE: 7 (ref 4.5–8)
PROTEIN (URINE DIPSTICK): NEGATIVE MG/DL
SPECIFIC GRAVITY: 1.02 (ref 1–1.03)
URINE-COLOR: YELLOW
UROBILINOGEN,SEMI-QN: 0.2 MG/DL (ref 0–1.9)

## 2022-06-01 PROCEDURE — 81002 URINALYSIS NONAUTO W/O SCOPE: CPT | Performed by: OBSTETRICS & GYNECOLOGY

## 2022-06-01 PROCEDURE — 3078F DIAST BP <80 MM HG: CPT | Performed by: OBSTETRICS & GYNECOLOGY

## 2022-06-01 PROCEDURE — 3074F SYST BP LT 130 MM HG: CPT | Performed by: OBSTETRICS & GYNECOLOGY

## 2022-06-01 NOTE — TELEPHONE ENCOUNTER
PER LANCEK patient needs to come back 7th and 11 of next week please call to schedule. There is nothing open.

## 2022-06-02 ENCOUNTER — HOSPITAL ENCOUNTER (INPATIENT)
Facility: HOSPITAL | Age: 32
LOS: 2 days | Discharge: HOME | End: 2022-06-04
Attending: OBSTETRICS & GYNECOLOGY | Admitting: OBSTETRICS & GYNECOLOGY
Payer: COMMERCIAL

## 2022-06-02 ENCOUNTER — ANESTHESIA (OUTPATIENT)
Dept: OBGYN UNIT | Facility: HOSPITAL | Age: 32
End: 2022-06-02
Payer: COMMERCIAL

## 2022-06-02 ENCOUNTER — ANESTHESIA EVENT (OUTPATIENT)
Dept: OBGYN UNIT | Facility: HOSPITAL | Age: 32
End: 2022-06-02
Payer: COMMERCIAL

## 2022-06-02 PROBLEM — Z34.90 PREGNANCY: Status: ACTIVE | Noted: 2022-06-02

## 2022-06-02 PROBLEM — Z34.90 PREGNANCY (HCC): Status: ACTIVE | Noted: 2022-06-02

## 2022-06-02 LAB
ANTIBODY SCREEN: NEGATIVE
BASOPHILS # BLD AUTO: 0.02 X10(3) UL (ref 0–0.2)
BASOPHILS NFR BLD AUTO: 0.2 %
DEPRECATED RDW RBC AUTO: 39.5 FL (ref 35.1–46.3)
EOSINOPHIL # BLD AUTO: 0.03 X10(3) UL (ref 0–0.7)
EOSINOPHIL NFR BLD AUTO: 0.3 %
ERYTHROCYTE [DISTWIDTH] IN BLOOD BY AUTOMATED COUNT: 12.2 % (ref 11–15)
FETAL SCREEN RESULT: NEGATIVE
HCT VFR BLD AUTO: 36.9 %
HGB BLD-MCNC: 11.7 G/DL
IMM GRANULOCYTES # BLD AUTO: 0.06 X10(3) UL (ref 0–1)
IMM GRANULOCYTES NFR BLD: 0.7 %
LYMPHOCYTES # BLD AUTO: 1.51 X10(3) UL (ref 1–4)
LYMPHOCYTES NFR BLD AUTO: 17.5 %
MCH RBC QN AUTO: 27.7 PG (ref 26–34)
MCHC RBC AUTO-ENTMCNC: 31.7 G/DL (ref 31–37)
MCV RBC AUTO: 87.4 FL
MONOCYTES # BLD AUTO: 0.68 X10(3) UL (ref 0.1–1)
MONOCYTES NFR BLD AUTO: 7.9 %
NEUTROPHILS # BLD AUTO: 6.31 X10 (3) UL (ref 1.5–7.7)
NEUTROPHILS # BLD AUTO: 6.31 X10(3) UL (ref 1.5–7.7)
NEUTROPHILS NFR BLD AUTO: 73.4 %
PLATELET # BLD AUTO: 241 10(3)UL (ref 150–450)
RBC # BLD AUTO: 4.22 X10(6)UL
RH BLOOD TYPE: NEGATIVE
WBC # BLD AUTO: 8.6 X10(3) UL (ref 4–11)

## 2022-06-02 PROCEDURE — 0HQ9XZZ REPAIR PERINEUM SKIN, EXTERNAL APPROACH: ICD-10-PCS | Performed by: OBSTETRICS & GYNECOLOGY

## 2022-06-02 PROCEDURE — 59400 OBSTETRICAL CARE: CPT | Performed by: OBSTETRICS & GYNECOLOGY

## 2022-06-02 RX ORDER — LIDOCAINE HYDROCHLORIDE AND EPINEPHRINE 15; 5 MG/ML; UG/ML
INJECTION, SOLUTION EPIDURAL
Status: COMPLETED | OUTPATIENT
Start: 2022-06-02 | End: 2022-06-02

## 2022-06-02 RX ORDER — LIDOCAINE HYDROCHLORIDE 10 MG/ML
INJECTION, SOLUTION INFILTRATION; PERINEURAL
Status: COMPLETED | OUTPATIENT
Start: 2022-06-02 | End: 2022-06-02

## 2022-06-02 RX ORDER — BISACODYL 10 MG
10 SUPPOSITORY, RECTAL RECTAL ONCE AS NEEDED
Status: DISCONTINUED | OUTPATIENT
Start: 2022-06-02 | End: 2022-06-04

## 2022-06-02 RX ORDER — DEXTROSE, SODIUM CHLORIDE, SODIUM LACTATE, POTASSIUM CHLORIDE, AND CALCIUM CHLORIDE 5; .6; .31; .03; .02 G/100ML; G/100ML; G/100ML; G/100ML; G/100ML
INJECTION, SOLUTION INTRAVENOUS CONTINUOUS
Status: DISCONTINUED | OUTPATIENT
Start: 2022-06-02 | End: 2022-06-02 | Stop reason: HOSPADM

## 2022-06-02 RX ORDER — ACETAMINOPHEN 500 MG
500 TABLET ORAL EVERY 6 HOURS PRN
Status: DISCONTINUED | OUTPATIENT
Start: 2022-06-02 | End: 2022-06-02 | Stop reason: HOSPADM

## 2022-06-02 RX ORDER — DOCUSATE SODIUM 100 MG/1
100 CAPSULE, LIQUID FILLED ORAL
Status: DISCONTINUED | OUTPATIENT
Start: 2022-06-02 | End: 2022-06-04

## 2022-06-02 RX ORDER — TERBUTALINE SULFATE 1 MG/ML
0.25 INJECTION, SOLUTION SUBCUTANEOUS AS NEEDED
Status: DISCONTINUED | OUTPATIENT
Start: 2022-06-02 | End: 2022-06-02 | Stop reason: HOSPADM

## 2022-06-02 RX ORDER — BUPIVACAINE HYDROCHLORIDE 2.5 MG/ML
INJECTION, SOLUTION EPIDURAL; INFILTRATION; INTRACAUDAL
Status: COMPLETED | OUTPATIENT
Start: 2022-06-02 | End: 2022-06-02

## 2022-06-02 RX ORDER — BUPIVACAINE HCL/0.9 % NACL/PF 0.25 %
5 PLASTIC BAG, INJECTION (ML) EPIDURAL AS NEEDED
Status: DISCONTINUED | OUTPATIENT
Start: 2022-06-02 | End: 2022-06-03

## 2022-06-02 RX ORDER — AMMONIA INHALANTS 0.04 G/.3ML
0.3 INHALANT RESPIRATORY (INHALATION) AS NEEDED
Status: DISCONTINUED | OUTPATIENT
Start: 2022-06-02 | End: 2022-06-04

## 2022-06-02 RX ORDER — LIDOCAINE HYDROCHLORIDE 10 MG/ML
30 INJECTION, SOLUTION EPIDURAL; INFILTRATION; INTRACAUDAL; PERINEURAL ONCE
Status: DISCONTINUED | OUTPATIENT
Start: 2022-06-02 | End: 2022-06-02 | Stop reason: HOSPADM

## 2022-06-02 RX ORDER — SODIUM CHLORIDE, SODIUM LACTATE, POTASSIUM CHLORIDE, CALCIUM CHLORIDE 600; 310; 30; 20 MG/100ML; MG/100ML; MG/100ML; MG/100ML
INJECTION, SOLUTION INTRAVENOUS AS NEEDED
Status: DISCONTINUED | OUTPATIENT
Start: 2022-06-02 | End: 2022-06-02 | Stop reason: HOSPADM

## 2022-06-02 RX ORDER — BUPIVACAINE HYDROCHLORIDE 2.5 MG/ML
20 INJECTION, SOLUTION EPIDURAL; INFILTRATION; INTRACAUDAL ONCE
Status: COMPLETED | OUTPATIENT
Start: 2022-06-02 | End: 2022-06-02

## 2022-06-02 RX ORDER — ONDANSETRON 2 MG/ML
4 INJECTION INTRAMUSCULAR; INTRAVENOUS EVERY 6 HOURS PRN
Status: DISCONTINUED | OUTPATIENT
Start: 2022-06-02 | End: 2022-06-04

## 2022-06-02 RX ORDER — AMMONIA INHALANTS 0.04 G/.3ML
0.3 INHALANT RESPIRATORY (INHALATION) AS NEEDED
Status: DISCONTINUED | OUTPATIENT
Start: 2022-06-02 | End: 2022-06-02 | Stop reason: HOSPADM

## 2022-06-02 RX ORDER — ONDANSETRON 2 MG/ML
4 INJECTION INTRAMUSCULAR; INTRAVENOUS EVERY 6 HOURS PRN
Status: DISCONTINUED | OUTPATIENT
Start: 2022-06-02 | End: 2022-06-02 | Stop reason: HOSPADM

## 2022-06-02 RX ORDER — NALBUPHINE HCL 10 MG/ML
2.5 AMPUL (ML) INJECTION
Status: DISCONTINUED | OUTPATIENT
Start: 2022-06-02 | End: 2022-06-03

## 2022-06-02 RX ORDER — IBUPROFEN 600 MG/1
600 TABLET ORAL EVERY 6 HOURS PRN
Status: DISCONTINUED | OUTPATIENT
Start: 2022-06-02 | End: 2022-06-02 | Stop reason: HOSPADM

## 2022-06-02 RX ORDER — DIAPER,BRIEF,INFANT-TODD,DISP
1 EACH MISCELLANEOUS EVERY 6 HOURS PRN
Status: DISCONTINUED | OUTPATIENT
Start: 2022-06-02 | End: 2022-06-04

## 2022-06-02 RX ORDER — TRISODIUM CITRATE DIHYDRATE AND CITRIC ACID MONOHYDRATE 500; 334 MG/5ML; MG/5ML
30 SOLUTION ORAL AS NEEDED
Status: DISCONTINUED | OUTPATIENT
Start: 2022-06-02 | End: 2022-06-02 | Stop reason: HOSPADM

## 2022-06-02 RX ORDER — SIMETHICONE 80 MG
80 TABLET,CHEWABLE ORAL 3 TIMES DAILY PRN
Status: DISCONTINUED | OUTPATIENT
Start: 2022-06-02 | End: 2022-06-04

## 2022-06-02 RX ORDER — ACETAMINOPHEN 500 MG
1000 TABLET ORAL EVERY 6 HOURS PRN
Status: DISCONTINUED | OUTPATIENT
Start: 2022-06-02 | End: 2022-06-04

## 2022-06-02 RX ORDER — IBUPROFEN 600 MG/1
600 TABLET ORAL EVERY 6 HOURS
Status: DISCONTINUED | OUTPATIENT
Start: 2022-06-02 | End: 2022-06-04

## 2022-06-02 RX ORDER — ACETAMINOPHEN 500 MG
500 TABLET ORAL EVERY 6 HOURS PRN
Status: DISCONTINUED | OUTPATIENT
Start: 2022-06-02 | End: 2022-06-04

## 2022-06-02 RX ADMIN — LIDOCAINE HYDROCHLORIDE AND EPINEPHRINE 5 ML: 15; 5 INJECTION, SOLUTION EPIDURAL at 16:22:00

## 2022-06-02 RX ADMIN — LIDOCAINE HYDROCHLORIDE 5 ML: 10 INJECTION, SOLUTION INFILTRATION; PERINEURAL at 16:22:00

## 2022-06-02 RX ADMIN — BUPIVACAINE HYDROCHLORIDE 5 ML: 2.5 INJECTION, SOLUTION EPIDURAL; INFILTRATION; INTRACAUDAL at 16:22:00

## 2022-06-02 NOTE — ANESTHESIA PROCEDURE NOTES
Labor Analgesia    Date/Time: 6/2/2022 4:22 PM  Performed by: Edra Severin, MD  Authorized by: Edra Severin, MD       General Information and Staff    Start Time:  6/2/2022 4:12 PM  End Time:  6/2/2022 4:22 PM  Anesthesiologist:  Edra Severin, MD  Performed by:   Anesthesiologist  Site Identification: surface landmarks    Reason for Block: labor epidural    Preanesthetic Checklist: patient identified, IV checked, risks and benefits discussed, monitors and equipment checked, pre-op evaluation, timeout performed, anesthesia consent and sterile technique used      Procedure Details    Patient Position:  Sitting  Prep: Betadine and patient draped    Monitoring:  Heart rate, cardiac monitor and continuous pulse ox  Approach:  Midline    Epidural Needle    Injection Technique:  FELIX air  Needle Type:  Tuohy  Needle Gauge:  18 G  Needle Length:  3.375 in  Needle Insertion Depth:  6.5    Spinal Needle      Catheter    Catheter Type:  Side hole  Catheter Size:  20 G  Catheter at Skin Depth:  14    Assessment  Sensory Level:  T8    Additional Comments

## 2022-06-02 NOTE — PROGRESS NOTES
Pt is a 27year old female admitted to TR3/TR3-A. Patient presents with:  R/o Rom: leaking since 530     Pt is  39w4d intra-uterine pregnancy. History obtained, consents signed. Oriented to room, staff, and plan of care.

## 2022-06-02 NOTE — PROGRESS NOTES
Late entry    RN at pt bedside when received call from Dr. Laila Garcia regarding T.O. for pitocin protocol 2 to be started now and pt may received epidural PRN. Once RN hung up with Dr. Laila Garcia and reviewed POC with pt. Pt verbalized that she would like to \"wait 2-3 hours for pitocin to be started and see what my body does on its own. \" RN verbalized understanding and will notify Laila Garcia.

## 2022-06-03 LAB
BASOPHILS # BLD AUTO: 0.02 X10(3) UL (ref 0–0.2)
BASOPHILS NFR BLD AUTO: 0.2 %
DEPRECATED RDW RBC AUTO: 39.3 FL (ref 35.1–46.3)
EOSINOPHIL # BLD AUTO: 0.06 X10(3) UL (ref 0–0.7)
EOSINOPHIL NFR BLD AUTO: 0.5 %
ERYTHROCYTE [DISTWIDTH] IN BLOOD BY AUTOMATED COUNT: 12.2 % (ref 11–15)
HCT VFR BLD AUTO: 30.9 %
HGB BLD-MCNC: 9.8 G/DL
IMM GRANULOCYTES # BLD AUTO: 0.07 X10(3) UL (ref 0–1)
IMM GRANULOCYTES NFR BLD: 0.6 %
LYMPHOCYTES # BLD AUTO: 1.67 X10(3) UL (ref 1–4)
LYMPHOCYTES NFR BLD AUTO: 13.3 %
MCH RBC QN AUTO: 27.6 PG (ref 26–34)
MCHC RBC AUTO-ENTMCNC: 31.7 G/DL (ref 31–37)
MCV RBC AUTO: 87 FL
MONOCYTES # BLD AUTO: 1.14 X10(3) UL (ref 0.1–1)
MONOCYTES NFR BLD AUTO: 9.1 %
NEUTROPHILS # BLD AUTO: 9.6 X10 (3) UL (ref 1.5–7.7)
NEUTROPHILS # BLD AUTO: 9.6 X10(3) UL (ref 1.5–7.7)
NEUTROPHILS NFR BLD AUTO: 76.3 %
PLATELET # BLD AUTO: 217 10(3)UL (ref 150–450)
RBC # BLD AUTO: 3.55 X10(6)UL
WBC # BLD AUTO: 12.6 X10(3) UL (ref 4–11)

## 2022-06-03 PROCEDURE — 3E0334Z INTRODUCTION OF SERUM, TOXOID AND VACCINE INTO PERIPHERAL VEIN, PERCUTANEOUS APPROACH: ICD-10-PCS | Performed by: OBSTETRICS & GYNECOLOGY

## 2022-06-03 NOTE — DISCHARGE SUMMARY
[facility] HOSP [facility]    Discharge Summary    Yris Maza Patient Status:  Inpatient    1990 MRN [id number]   Location [address] Attending Heather Adams DO   Hosp Day # 0       Admission Date:  2022    Discharge Date: 2022    Delivering OB Clinician:  Dr Robert Bell    [facility]: Estimated Date of Delivery: 22    Gestational Age: 39w4d    Antepartum complications: Patient Active Problem List:     Rh negative state in antepartum period     COVID-19 affecting pregnancy in third trimester     Pregnancy     Full-term PROM with onset of labor within 24 hours of rupture      Date of Delivery: 2022 Time of Delivery: 7:30 PM    Delivery Type: spontaneous vaginal delivery    Baby: Liveborn male Information for the patient's : Joan Escobedo [[id number]]   6 lb 5.6 oz (2.88 kg)  Apgars:  1 minute: 9  5 minutes: 9  10 minutes:        Hospital Course:  Admitted with PROM. Pitocin augmentation. 1st degree perineal laceration. No complications.  Routine delivery and postpartum care    Discharged Condition: stable    Disposition: home    Plan:     Follow-up appointment in 6 weeks with Dr. Robert Bell

## 2022-06-03 NOTE — LACTATION NOTE
LACTATION NOTE - MOTHER      Evaluation Type: Inpatient    Problems identified  Problems identified: Knowledge deficit         Breastfeeding goal  Breastfeeding goal: To maintain breast milk feeding per patient goal    Maternal Assessment  Bilateral Breasts: Symmetrical;Soft  Bilateral Nipples: WNL; Everted  Prior breastfeeding experience (comment below): Multip         Guidelines for use of: Other (comment): Mother describes sleepy baby after circumcision. Encouraged skin to skin, hand expression, and spoon feeding when the infant is too sleepy. Encouraged to call with the next feeding for a latch check.

## 2022-06-03 NOTE — PROGRESS NOTES
Patient up to bathroom with assist x 2. Straight cath 500. Patient transferred to mother/baby room 358 per wheelchair in stable condition with baby and personal belongings. Accompanied by significant other and staff. Report given to [facility].

## 2022-06-03 NOTE — L&D DELIVERY NOTE
[facility] [place]    Vaginal Delivery Note    Volmiguel Mai Patient Status:  Inpatient    1990 MRN [id number]   Location [address]  Attending Negar Larson Ra,    Hosp Day # 0 PCP Bouchra Krishna DO       Delivery     Infant  Date of Delivery: 2022   Time of Delivery: 7:30 PM  Delivery Type: Normal spontaneous vaginal delivery    Infant Sex  Information for the patient's : Geni Maciel [[id number]]   male    Infant Birthweight  Information for the patient's : Geni Maciel [[id number]]   6 lb 5.6 oz (2.88 kg)     Presentation Vertex [1]  Position Right [3] Occiput [1] Anterior [1]    Apgars:  1 minute: 9               5 minutes: 9                        10 minutes:      Placenta  Date/Time of Delivery: 2022  7:33 PM   Delivery: spontaneous  Placenta to Pathology: yes  covid    Cord info:  Cord Gases Submitted: no  Cord Blood Collection: no  Cord Tissue Collection: no  Cord Complications: none      Episiotomy/Laceration Repair:  1st degree perineal laceration repaired with 3-0 Vicryl    Maternal Anesthesia: epidural     Delivery Complications  none    Neonatologist Present: no    QBL:  200cc    Sponge and Needle Counts:  Verified    Delivery Comment:  Pushed with one contraction. Suctioned at perineum. SHERLY. Delayed cord clamping x 30 sec.          Saida Quiroz MD   2022  7:44 PM

## 2022-06-04 VITALS
RESPIRATION RATE: 18 BRPM | OXYGEN SATURATION: 98 % | TEMPERATURE: 99 F | HEART RATE: 82 BPM | SYSTOLIC BLOOD PRESSURE: 114 MMHG | DIASTOLIC BLOOD PRESSURE: 56 MMHG

## 2022-06-04 PROBLEM — Z34.90 PREGNANCY: Status: RESOLVED | Noted: 2022-06-02 | Resolved: 2022-06-04

## 2022-06-04 PROBLEM — U07.1 COVID-19 AFFECTING PREGNANCY IN THIRD TRIMESTER: Status: RESOLVED | Noted: 2022-04-28 | Resolved: 2022-06-04

## 2022-06-04 PROBLEM — O98.513 COVID-19 AFFECTING PREGNANCY IN THIRD TRIMESTER: Status: RESOLVED | Noted: 2022-04-28 | Resolved: 2022-06-04

## 2022-06-04 PROBLEM — O98.513 COVID-19 AFFECTING PREGNANCY IN THIRD TRIMESTER (HCC): Status: RESOLVED | Noted: 2022-04-28 | Resolved: 2022-06-04

## 2022-06-04 PROBLEM — Z34.90 PREGNANCY (HCC): Status: RESOLVED | Noted: 2022-06-02 | Resolved: 2022-06-04

## 2022-06-04 PROBLEM — U07.1 COVID-19 AFFECTING PREGNANCY IN THIRD TRIMESTER (HCC): Status: RESOLVED | Noted: 2022-04-28 | Resolved: 2022-06-04

## 2022-06-04 RX ORDER — IBUPROFEN 600 MG/1
600 TABLET ORAL EVERY 6 HOURS
Qty: 30 TABLET | Refills: 1 | Status: SHIPPED | OUTPATIENT
Start: 2022-06-04

## 2022-06-04 NOTE — PLAN OF CARE
Problem: BIRTH - VAGINAL/ SECTION  Goal: Fetal and maternal status remain reassuring during the birth process  Description: INTERVENTIONS:  - Monitor vital signs  - Monitor fetal heart rate  - Monitor uterine activity  - Monitor labor progression (vaginal delivery)  - DVT prophylaxis (C/S delivery)  - Surgical antibiotic prophylaxis (C/S delivery)  Outcome: Progressing     Problem: PAIN - ADULT  Goal: Verbalizes/displays adequate comfort level or patient's stated pain goal  Description: INTERVENTIONS:  - Encourage pt to monitor pain and request assistance  - Assess pain using appropriate pain scale  - Administer analgesics based on type and severity of pain and evaluate response  - Implement non-pharmacological measures as appropriate and evaluate response  - Consider cultural and social influences on pain and pain management  - Manage/alleviate anxiety  - Utilize distraction and/or relaxation techniques  - Monitor for opioid side effects  - Notify MD/LIP if interventions unsuccessful or patient reports new pain  - Anticipate increased pain with activity and pre-medicate as appropriate  Outcome: Progressing     Problem: ANXIETY  Goal: Will report anxiety at manageable levels  Description: INTERVENTIONS:  - Administer medication as ordered  - Teach and rehearse alternative coping skills  - Provide emotional support with 1:1 interaction with staff  Outcome: Progressing     Problem: POSTPARTUM  Goal: Long Term Goal:Experiences normal postpartum course  Description: INTERVENTIONS:  - Assess and monitor vital signs and lab values. - Assess fundus and lochia. - Provide ice/sitz baths for perineum discomfort. - Monitor healing of incision/episiotomy/laceration, and assess for signs and symptoms of infection and hematoma. - Assess bladder function and monitor for bladder distention.  - Provide/instruct/assist with pericare as needed. - Provide VTE prophylaxis as needed.   - Monitor bowel function.  - Encourage ambulation and provide assistance as needed. - Assess and monitor emotional status and provide social service/psych resources as needed. - Utilize standard precautions and use personal protective equipment as indicated. Ensure aseptic care of all intravenous lines and invasive tubes/drains.  - Obtain immunization and exposure to communicable diseases history. Outcome: Progressing  Goal: Optimize infant feeding at the breast  Description: INTERVENTIONS:  - Initiate breast feeding within first hour after birth. - Monitor effectiveness of current breast feeding efforts. - Assess support systems available to mother/family.  - Identify cultural beliefs/practices regarding lactation, letdown techniques, maternal food preferences. - Assess mother's knowledge and previous experience with breast feeding.  - Provide information as needed about early infant feeding cues (e.g., rooting, lip smacking, sucking fingers/hand) versus late cue of crying.  - Discuss/demonstrate breast feeding aids (e.g., infant sling, nursing footstool/pillows, and breast pumps). - Encourage mother/other family members to express feelings/concerns, and actively listen. - Educate father/SO about benefits of breast feeding and how to manage common lactation challenges. - Recommend avoidance of specific medications or substances incompatible with breast feeding.  - Assess and monitor for signs of nipple pain/trauma. - Instruct and provide assistance with proper latch. - Review techniques for milk expression (breast pumping) and storage of breast milk. Provide pumping equipment/supplies, instructions and assistance, as needed. - Encourage rooming-in and breast feeding on demand.  - Encourage skin-to-skin contact. - Provide LC support as needed. - Assess for and manage engorgement. - Provide breast feeding education handouts and information on community breast feeding support.    Outcome: Progressing  Goal: Establishment of adequate milk supply with medication/procedure interruptions  Description: INTERVENTIONS:  - Review techniques for milk expression (breast pumping). - Provide pumping equipment/supplies, instructions, and assistance until it is safe to breastfeed infant. Outcome: Progressing  Goal: Appropriate maternal -  bonding  Description: INTERVENTIONS:  - Assess caregiver- interactions. - Assess caregiver's emotional status and coping mechanisms. - Encourage caregiver to participate in  daily care. - Assess support systems available to mother/family.  - Provide /case management support as needed.   Outcome: Progressing

## 2022-06-04 NOTE — PLAN OF CARE
Sat with patient to review plan of care. Discussed analgesic options and answered all questions. VSS. Breastfeeding on demand. Breastfeeding successfully. Voiding independently. Lochia is WNL. Uterus is firm. Problem: BIRTH - VAGINAL/ SECTION  Goal: Fetal and maternal status remain reassuring during the birth process  Description: INTERVENTIONS:  - Monitor vital signs  - Monitor fetal heart rate  - Monitor uterine activity  - Monitor labor progression (vaginal delivery)  - DVT prophylaxis (C/S delivery)  - Surgical antibiotic prophylaxis (C/S delivery)  Outcome: Progressing     Problem: PAIN - ADULT  Goal: Verbalizes/displays adequate comfort level or patient's stated pain goal  Description: INTERVENTIONS:  - Encourage pt to monitor pain and request assistance  - Assess pain using appropriate pain scale  - Administer analgesics based on type and severity of pain and evaluate response  - Implement non-pharmacological measures as appropriate and evaluate response  - Consider cultural and social influences on pain and pain management  - Manage/alleviate anxiety  - Utilize distraction and/or relaxation techniques  - Monitor for opioid side effects  - Notify MD/LIP if interventions unsuccessful or patient reports new pain  - Anticipate increased pain with activity and pre-medicate as appropriate  Outcome: Progressing     Problem: ANXIETY  Goal: Will report anxiety at manageable levels  Description: INTERVENTIONS:  - Administer medication as ordered  - Teach and rehearse alternative coping skills  - Provide emotional support with 1:1 interaction with staff  Outcome: Progressing     Problem: POSTPARTUM  Goal: Long Term Goal:Experiences normal postpartum course  Description: INTERVENTIONS:  - Assess and monitor vital signs and lab values. - Assess fundus and lochia. - Provide ice/sitz baths for perineum discomfort.   - Monitor healing of incision/episiotomy/laceration, and assess for signs and symptoms of infection and hematoma. - Assess bladder function and monitor for bladder distention.  - Provide/instruct/assist with pericare as needed. - Provide VTE prophylaxis as needed. - Monitor bowel function.  - Encourage ambulation and provide assistance as needed. - Assess and monitor emotional status and provide social service/psych resources as needed. - Utilize standard precautions and use personal protective equipment as indicated. Ensure aseptic care of all intravenous lines and invasive tubes/drains.  - Obtain immunization and exposure to communicable diseases history. Outcome: Progressing  Goal: Optimize infant feeding at the breast  Description: INTERVENTIONS:  - Initiate breast feeding within first hour after birth. - Monitor effectiveness of current breast feeding efforts. - Assess support systems available to mother/family.  - Identify cultural beliefs/practices regarding lactation, letdown techniques, maternal food preferences. - Assess mother's knowledge and previous experience with breast feeding.  - Provide information as needed about early infant feeding cues (e.g., rooting, lip smacking, sucking fingers/hand) versus late cue of crying.  - Discuss/demonstrate breast feeding aids (e.g., infant sling, nursing footstool/pillows, and breast pumps). - Encourage mother/other family members to express feelings/concerns, and actively listen. - Educate father/SO about benefits of breast feeding and how to manage common lactation challenges. - Recommend avoidance of specific medications or substances incompatible with breast feeding.  - Assess and monitor for signs of nipple pain/trauma. - Instruct and provide assistance with proper latch. - Review techniques for milk expression (breast pumping) and storage of breast milk. Provide pumping equipment/supplies, instructions and assistance, as needed. - Encourage rooming-in and breast feeding on demand.  - Encourage skin-to-skin contact.   - Provide LC support as needed. - Assess for and manage engorgement. - Provide breast feeding education handouts and information on community breast feeding support. Outcome: Progressing  Goal: Establishment of adequate milk supply with medication/procedure interruptions  Description: INTERVENTIONS:  - Review techniques for milk expression (breast pumping). - Provide pumping equipment/supplies, instructions, and assistance until it is safe to breastfeed infant. Outcome: Progressing  Goal: Experiences normal breast weaning course  Description: INTERVENTIONS:  - Assess for and manage engorgement. - Instruct on breast care. - Provide comfort measures. Outcome: Progressing  Goal: Appropriate maternal -  bonding  Description: INTERVENTIONS:  - Assess caregiver- interactions. - Assess caregiver's emotional status and coping mechanisms. - Encourage caregiver to participate in  daily care. - Assess support systems available to mother/family.  - Provide /case management support as needed.   Outcome: Progressing

## 2022-06-04 NOTE — PROGRESS NOTES
Discharge order received from MD. Mom in stable condition. Mom and baby ID band removed and numbers verified against eachother. Discharge instructions given regarding pelvic rest, preeclampsia signs and symptoms, MD followup, and pain medications. Mom escorted to vehicle via wheelchair, baby secured in carseat.

## 2022-06-04 NOTE — LACTATION NOTE
This note was copied from a baby's chart. LACTATION NOTE - INFANT    Evaluation Type  Evaluation Type: Inpatient    Problems & Assessment  Problems Diagnosed or Identified: Jaundice  Infant Assessment: Hunger cues present;Skin color: pink or appropriate for ethnicity  Muscle tone: Appropriate for GA    Feeding Assessment  Summary Current Feeding: Adlib;Breastfeeding with formula supplement  Breastfeeding Assessment: Assisted with breastfeeding w/mother's permission;Calm and ready to breastfeed;Deep latch achieved and observed; Coordinated suck/swallow; Tolerated feeding well  Breastfeeding Positions: cradle;right breast;left breast  Latch: Grasps breast, tongue down, lips flanged, rhythmic sucking  Audible Sucks/Swallows: Spontaneous and intermittent (24 hours old)  Type of Nipple: Everted (after stimulation)  Comfort (Breast/Nipple): Soft/non-tender  Hold (Positioning): Full assist, teach one side, mother does other, staff holds  [facility] Score: 9    Output  # Voids in 24 hours: 2  # Stools in 24 hours: 1    Pre/Post Weights  Supplement Type: Formula    Equipment used  Equipment used:  Bottle with slow flow nipple

## 2022-06-04 NOTE — LACTATION NOTE
LACTATION NOTE - MOTHER      Evaluation Type: Inpatient    Problems identified  Problems identified: Knowledge deficit  Problems Identified Other: Infant with jaundice         Breastfeeding goal  Breastfeeding goal: To maintain breast milk feeding per patient goal    Maternal Assessment  Bilateral Breasts: Soft;Symmetrical  Bilateral Nipples: Everted  Prior breastfeeding experience (comment below): Multip; Successful  Prior BF experience: comment: 13 y.o. child BF for 3 months  Breastfeeding Assistance: Breastfeeding assistance provided with permission    Pain assessment  Pain scale comment: denies  Treatment of Sore Nipples: Lanolin;Deeper latch techniques    Guidelines for use of:  Equipment: Lanolin  Breast pump type: Ameda Platinum  Suggested use of pump: Pump each time a supplement is offered;Pump if infant is not latching to breast  Other (comment): Infant with jaundice, mom reports peds instructed to supplement with formula. Discussed guidelines for supplementing and pumping, frequent feedings, I/O, early hunger cues, pacifier use, and pathophys of jaundice. Assisted w/ BF, minor positioning adjustments made to achieve deep latch. Breast pump initiated, instructed on use and also demonstrated use of handheld pump for home. Breast pump resource handout given and informed of breast pump rentals available and outpatient [facility].

## 2022-06-04 NOTE — PLAN OF CARE
Problem: BIRTH - VAGINAL/ SECTION  Goal: Fetal and maternal status remain reassuring during the birth process  Description: INTERVENTIONS:  - Monitor vital signs  - Monitor fetal heart rate  - Monitor uterine activity  - Monitor labor progression (vaginal delivery)  - DVT prophylaxis (C/S delivery)  - Surgical antibiotic prophylaxis (C/S delivery)  2022 1157 by Johnathan Snell RN  Outcome: Completed  2022 1020 by Johnathan Snell RN  Outcome: Progressing     Problem: PAIN - ADULT  Goal: Verbalizes/displays adequate comfort level or patient's stated pain goal  Description: INTERVENTIONS:  - Encourage pt to monitor pain and request assistance  - Assess pain using appropriate pain scale  - Administer analgesics based on type and severity of pain and evaluate response  - Implement non-pharmacological measures as appropriate and evaluate response  - Consider cultural and social influences on pain and pain management  - Manage/alleviate anxiety  - Utilize distraction and/or relaxation techniques  - Monitor for opioid side effects  - Notify MD/LIP if interventions unsuccessful or patient reports new pain  - Anticipate increased pain with activity and pre-medicate as appropriate  2022 1157 by Johnathan Snell RN  Outcome: Completed  2022 1020 by Johnathan Snell RN  Outcome: Progressing     Problem: ANXIETY  Goal: Will report anxiety at manageable levels  Description: INTERVENTIONS:  - Administer medication as ordered  - Teach and rehearse alternative coping skills  - Provide emotional support with 1:1 interaction with staff  2022 1157 by Johnathan Snell RN  Outcome: Completed  2022 1020 by Johnathan Snell RN  Outcome: Progressing     Problem: POSTPARTUM  Goal: Long Term Goal:Experiences normal postpartum course  Description: INTERVENTIONS:  - Assess and monitor vital signs and lab values. - Assess fundus and lochia. - Provide ice/sitz baths for perineum discomfort.   - Monitor healing of incision/episiotomy/laceration, and assess for signs and symptoms of infection and hematoma. - Assess bladder function and monitor for bladder distention.  - Provide/instruct/assist with pericare as needed. - Provide VTE prophylaxis as needed. - Monitor bowel function.  - Encourage ambulation and provide assistance as needed. - Assess and monitor emotional status and provide social service/psych resources as needed. - Utilize standard precautions and use personal protective equipment as indicated. Ensure aseptic care of all intravenous lines and invasive tubes/drains.  - Obtain immunization and exposure to communicable diseases history. 6/4/2022 1157 by Maggy Dent RN  Outcome: Completed  6/4/2022 1020 by Maggy Dent RN  Outcome: Progressing  Goal: Optimize infant feeding at the breast  Description: INTERVENTIONS:  - Initiate breast feeding within first hour after birth. - Monitor effectiveness of current breast feeding efforts. - Assess support systems available to mother/family.  - Identify cultural beliefs/practices regarding lactation, letdown techniques, maternal food preferences. - Assess mother's knowledge and previous experience with breast feeding.  - Provide information as needed about early infant feeding cues (e.g., rooting, lip smacking, sucking fingers/hand) versus late cue of crying.  - Discuss/demonstrate breast feeding aids (e.g., infant sling, nursing footstool/pillows, and breast pumps). - Encourage mother/other family members to express feelings/concerns, and actively listen. - Educate father/SO about benefits of breast feeding and how to manage common lactation challenges. - Recommend avoidance of specific medications or substances incompatible with breast feeding.  - Assess and monitor for signs of nipple pain/trauma. - Instruct and provide assistance with proper latch. - Review techniques for milk expression (breast pumping) and storage of breast milk.  Provide pumping equipment/supplies, instructions and assistance, as needed. - Encourage rooming-in and breast feeding on demand.  - Encourage skin-to-skin contact. - Provide LC support as needed. - Assess for and manage engorgement. - Provide breast feeding education handouts and information on community breast feeding support. 2022 1157 by Guanakito Dimas RN  Outcome: Completed  2022 by Guanakito Dimas RN  Outcome: Progressing  Goal: Establishment of adequate milk supply with medication/procedure interruptions  Description: INTERVENTIONS:  - Review techniques for milk expression (breast pumping). - Provide pumping equipment/supplies, instructions, and assistance until it is safe to breastfeed infant. 2022 115 by Guanakito Dimas RN  Outcome: Completed  2022 by Guanakito Dimas RN  Outcome: Progressing  Goal: Experiences normal breast weaning course  Description: INTERVENTIONS:  - Assess for and manage engorgement. - Instruct on breast care. - Provide comfort measures. 2022 115 by Guanakito Dimas RN  Outcome: Completed  2022 by Guanakito Dimas RN  Outcome: Progressing  Goal: Appropriate maternal -  bonding  Description: INTERVENTIONS:  - Assess caregiver- interactions. - Assess caregiver's emotional status and coping mechanisms. - Encourage caregiver to participate in  daily care. - Assess support systems available to mother/family.  - Provide /case management support as needed.   2022 1157 by Guanakito Dimas RN  Outcome: Completed  2022 by Guanakito Dimas RN  Outcome: Progressing

## 2022-06-06 ENCOUNTER — TELEPHONE (OUTPATIENT)
Dept: OBGYN CLINIC | Facility: CLINIC | Age: 32
End: 2022-06-06

## 2022-06-06 NOTE — TELEPHONE ENCOUNTER
FMLA forms received form patient. HIPAA release form signed. Fees off$25.00 not paid,The forms emailed and original given to forms dept.

## 2022-06-14 NOTE — TELEPHONE ENCOUNTER
Dr. Oskar Reyna      Please sign off on form: FMLA  -Highlight the patient and hit \"Chart\" button. -In Chart Review, w/in the Encounter tab - click 1 time on the Telephone call encounter for 06/06/22 Scroll down the telephone encounter.  -Click \"scan on\" blue Hyperlink under \"Media\" heading for FMLA Dr Oskar Reyna 6/14/22  w/in the telephone enc.  -Click on Acknowledge button at the bottom right corner and left-click onto image, signature stamp appears and drag signature to Provider signature line. Stamp will turn blue. Close window.      Thank you,    Emilie Osborn

## 2022-06-20 ENCOUNTER — TELEPHONE (OUTPATIENT)
Dept: OBGYN UNIT | Facility: HOSPITAL | Age: 32
End: 2022-06-20

## 2022-07-14 ENCOUNTER — POSTPARTUM (OUTPATIENT)
Dept: OBGYN CLINIC | Facility: CLINIC | Age: 32
End: 2022-07-14
Payer: COMMERCIAL

## 2022-07-14 VITALS
WEIGHT: 134.63 LBS | SYSTOLIC BLOOD PRESSURE: 91 MMHG | DIASTOLIC BLOOD PRESSURE: 61 MMHG | BODY MASS INDEX: 22 KG/M2 | HEART RATE: 79 BPM

## 2022-07-14 PROCEDURE — 3074F SYST BP LT 130 MM HG: CPT | Performed by: OBSTETRICS & GYNECOLOGY

## 2022-07-14 PROCEDURE — 3078F DIAST BP <80 MM HG: CPT | Performed by: OBSTETRICS & GYNECOLOGY

## 2022-07-14 RX ORDER — ACETAMINOPHEN AND CODEINE PHOSPHATE 120; 12 MG/5ML; MG/5ML
0.35 SOLUTION ORAL DAILY
Qty: 84 TABLET | Refills: 1 | Status: SHIPPED | OUTPATIENT
Start: 2022-07-14

## 2022-07-31 NOTE — PROGRESS NOTES
Got covid 4/18. Has MFM ultrasound scheduled.   RTC 2 wk Palliative Care Subsequent Visit      Patient Name: Freddie Regan  YOB: 1954  MRN: [id number]    Date of Visit: 7/12/2022  Visit Made By: Nancy Shin CNP   Location: Home Based     Primary Care Physician:   Arlette Pena DO  Office Phone #: [phone]  Fax Phone #: [phone]    Reason for Palliative Care: Pain and Symptom Management  Number of hospitalizations in the last year: 0  Last hospitalization: June 2021    Referred by PCP for palliative care at home for symptom (pain) management.     Patient Care Team:  Arlette Pena DO as PCP - General  Mihai Ordonez MD as Oncologist (Hematology & Oncology)  LISET Parks as [facility] at [facility]: SW (Social Work)  Nancy Shin CNP as [facility]: Clinician (Nurse Practitioner - Adult Health)    ADVANCE DIRECTIVES:  Jass Freddie ERIKA     AD Type ([facility]#[id number])     Value   User Date/Time Recorded    Power of  for Health Care  Michelle REJI Knight 10/5/2020 14:40:52          Decision Maker ([facility]#650)     Value   User Date/Time Recorded    Self  Michelle L Eugene 10/5/2020 14:40:52          Do you have an AD? ([facility]#[id number])     Value   User Date/Time Recorded    Yes  Michelle L Eugene 10/5/2020 14:40:52          [facility] Date Signed ([facility]#[id number])     Value   User Date/Time Recorded    6/26/2016  Michelle L Eugene 10/5/2020 14:40:52          [facility] LOCATION OF DOCUMENT ([facility]#[id number])     Value   User Date/Time Recorded    Scanned into Medical record  Michelle MENDOZA Eugene 10/5/2020 14:40:52          Primary Agent Name ([facility]#[id number])     Value   User Date/Time Recorded    Shellie MENDOZA Eugene 10/5/2020 14:40:52          Primary Agent Number ([facility]#[id number])     Value   User Date/Time Recorded    [phone]  Michelle MENDOZA Eugene 10/5/2020 14:40:52            Power of  Status:  Activated (spouse)  Code Status:  FULL CODE  Advanced Directive Forms: on file    History Obtained By: HX Obtained by: Patient and by his wife Jenn    Chief Complaint    Patient presents with   • APN follow-up   • Cancer   • Pain     Pt seen today for in-home palliative care visit to address symptom management and support for complex medical decision making.      Pt with PMH that includes: lung cancer with mets to brain and bone (thoracic spine w/ chronic compression fracture).  Pt is s/p craniotomy with tumor resection. Seizures controlled on Keppra.   Pt has intermittent pain in right neck, arm and  wrist; he does have residual right-sided weakness w/ sensory motor impairment and significantly decreased  strength. Ambulating with cane or walker with SBA. Endurance fair to poor. H/o common femoral vein DVT; on Xarelto. Receiving biweekly Opdivo infusions per oncology; disease has been stable.      Pt admits to chronic pain in the neck/shoulder/back on the right side. Also some lower back pain lately; possibly r/t lumbar compression fx noted on CT scan.  Attributed to a compression fx developed s/p RT. There is contracture in RUE. Uses heating pad, Biofreeze, Lidocaine gel and Norco PRN; utilizing medical marijuana to help reduce opioid intake and resultant constipation. Pt feels it is helping and also feels that his bowel movements have improved since cutting back a little on the Norco. On appropriate bowel regimen to correspond with opiate therapy.   Discussed recent clinical practice advisory related to Megace. Pt is agreeable to discontinuing Megace. Will follow appetite and track weight to assure stability. Encourage protein supplements. Pt using medical marijuana products that may help with appetite.          Review of Systems   Constitutional: Positive for fatigue. Negative for appetite change, chills, fever and unexpected weight change.   HENT: Negative.  Negative for trouble swallowing.    Eyes: Negative.    Respiratory: Negative for chest tightness, shortness of breath and wheezing.         LOCKETT, supplemental O2 PRN   Cardiovascular: Negative for chest pain,  palpitations and leg swelling.   Gastrointestinal: Negative for abdominal distention, abdominal pain, blood in stool, diarrhea, nausea and vomiting.   Genitourinary: Negative for difficulty urinating and hematuria.        Nocturia.   Musculoskeletal: Positive for arthralgias, back pain, gait problem, neck pain and neck stiffness. Negative for myalgias.   Skin: Negative for rash and wound.   Neurological: Positive for tremors and weakness. Negative for dizziness, syncope, light-headedness and headaches.        Tremor in right hand. No seizures.    Hematological: Does not bruise/bleed easily.   Psychiatric/Behavioral: Negative for confusion and sleep disturbance. The patient is not nervous/anxious.      Current Outpatient Medications   Medication Sig Dispense Refill   • HYDROcodone-acetaminophen (NORCO)  MG per tablet Indications: Pain TAKE 1 TABLET EVERY 6 HOURS AS NEEDED FOR PAIN 120 tablet 0   • predniSONE (DELTASONE) 5 MG tablet Take 1 tablet by mouth daily. 30 tablet 1   • tamsulosin (FLOMAX) 0.4 MG Cap TAKE 1 CAPSULE BY MOUTH EVERY DAY 30 capsule 11   • pantoprazole (PROTONIX) 20 MG tablet TAKE 1 TABLET BY MOUTH TWICE A DAY 60 tablet 5   • levETIRAcetam (KepPRA) 500 MG tablet TAKE 1 TABLET BY MOUTH 2 TIMES DAILY. 180 tablet 3   • NIVOlumab (Opdivo) 240 MG/24ML Solution Inject 220 mg into the vein twice monthly.     • lidocaine (LIDODERM) 5 % patch Place 1 patch onto the skin every 24 hours. Remove patch 12 hours after applying 30 patch 11   • methylpheniDATE (RITALIN) 5 MG tablet Take 1 tablet by mouth daily. Indications: Cancer-related fatigue 60 tablet 0   • Syringe, Disposable, (Syringe Luer Lock) 60 ML Misc 1 Syringe as needed (impaction). 1 each 1   • escitalopram (LEXAPRO) 10 MG tablet Take 1 tablet by mouth daily. 90 tablet 3   • Xarelto 20 MG Tab TAKE 1 TABLET BY MOUTH DAILY (WITH DINNER). INDICATIONS: BLOOD CLOT IN A DEEP VEIN 30 tablet 6   • levothyroxine 75 MCG tablet Take 1 tablet by mouth  daily. 90 tablet 3   • lidocaine (XYLOCAINE) 5 % ointment Apply topically as needed for Pain. 50 g 11   • fluticasone-umeclidin-vilanterol (TRELEGY ELLIPTA) 100-62.5-25 MCG/INH inhaler Inhale 1 puff into the lungs daily. 1 each 11   • albuterol 108 (90 Base) MCG/ACT inhaler Indications: Chronic Obstructive Lung Disease Inhale 2 puffs every four hours as needed for wheezing/shortness of breath. OK to take two puffs prior to exertional activities that cause shortness of breath. 1 each 11   • ipratropium-albuterol (DUONEB) 0.5-2.5 (3) MG/3ML nebulizer solution Indications: Chronic Obstructive Lung Disease Inhale one vial per nebulizer machine every 6 hours. 360 mL 11   • gabapentin (NEURONTIN) 100 MG capsule Take 1 capsule by mouth 4 times daily. 120 capsule 11   • Multiple Vitamins-Minerals (vitamin - therapeutic multivitamins w/minerals) tablet Take 1 tablet by mouth daily.     • ketoconazole (NIZORAL) 2 % shampoo Wet hair, apply shampoo to entire head/affected area, lather, and leave in place for 5 minutes. Rinse well with water. May use 2-3 times per week. (Patient taking differently: as needed. Wet hair, apply shampoo to entire head/affected area, lather, and leave in place for 5 minutes. Rinse well with water. May use 2-3 times per week.) 120 mL 2   • clotrimazole-betamethasone (LOTRISONE) 1-0.05 % cream Apply 1 application topically 2 times daily. Indications: Skin Infection due to Candida Yeast 45 g 3   • Wheat Dextrin (Benefiber) Powder Take 1 g by mouth daily. 1 Bottle 0   • Marijuana for Medical Use Take 5 mg by mouth 2 times daily as needed (pain).      • diphenhydrAMINE (BENADRYL) 25 MG tablet Take 50 mg by mouth every 6 hours as needed for Itching.     • guaifenesin (HUMIBID E) 400 MG Tab Take 400 mg by mouth every 12 hours.     • oxygen (O2) gas Inhale 2 L/min into the lungs as needed (SOB).     • polyethylene glycol (MIRALAX) 17 GM/SCOOP powder Take 17 g by mouth as needed.      • Selenium Sulfide 2.25  % Foam Apply bid 1 Can 11   • docusate sodium (COLACE) 100 MG capsule Take 100 mg by mouth 2 times daily.     • Sennosides (SENNA) 8.6 MG Cap Take 17.2 mg by mouth nightly. Do not start before September 14, 2020.     • Fexofenadine HCl (MUCINEX ALLERGY PO) Take 400 mg by mouth 2 times daily.      • MAGNESIUM OXIDE, ANTACID, PO Take 400 mg by mouth daily.        No current facility-administered medications for this visit.     ALLERGIES:  No Known Allergies  Past Medical History:   Diagnosis Date   • Acute bronchospasm 6/18/2010   • Acute deep vein thrombosis (DVT) of proximal vein of lower extremity (CMS/[facility]) 6/14/2021   • Cholecystitis 11/13/2016   • Closed nondisplaced fracture of greater trochanter of femur (CMS/[facility]) 11/23/2020   • COPD (chronic obstructive pulmonary disease) (CMS/[facility])    • Depressive disorder    • Lung abscess (CMS/[facility]) 11/13/2016   • Malignant neoplasm (CMS/[facility])    • Pneumonia due to infectious organism 2/20/2020   • Pressure injury of buttock, stage 2 (CMS/[facility]) 8/25/2021   • S/P cholecystectomy 1/25/2017   • Seizure (CMS/[facility])    • Unspecified protein-calorie malnutrition (CMS/[facility]) 3/22/2021     Past Surgical History:   Procedure Laterality Date   • Brain surgery N/A     X2   • Cholecystectomy     • Hip surgery     • Stent implant N/A     bile duct     Social History     Tobacco Use   Smoking Status Never Smoker   Smokeless Tobacco Never Used     Social History     Substance and Sexual Activity   Alcohol Use No     Social History     Substance and Sexual Activity   Drug Use Not on file     Family History   Problem Relation Age of Onset   • Coronary Artery Disease Mother    • Cancer, Breast Mother    • Coronary Artery Disease Father      Pain Assessment: Denies during visit.     Base Line Function: Independent prior to diagnosis    PPS: Palliative Performance Scale: 50    Current Functional Status: Walker/Cane, stand-by assist    Appetite: Fair to good; weight stable    Labs & Imaging :  N/a    Physical Exam  Constitutional:       General: He is not in acute distress.     Appearance: He is not toxic-appearing or diaphoretic.   HENT:      Head: Normocephalic and atraumatic.   Eyes:      General:         Right eye: No discharge.         Left eye: No discharge.   Cardiovascular:      Rate and Rhythm: Normal rate and regular rhythm.      Heart sounds: Normal heart sounds.   Pulmonary:      Effort: Pulmonary effort is normal. No respiratory distress.      Breath sounds: No wheezing or rales.   Abdominal:      General: Bowel sounds are normal. There is no distension.      Palpations: Abdomen is soft.      Tenderness: There is no abdominal tenderness.   Musculoskeletal:         General: Tenderness and deformity present.      Cervical back: Normal range of motion and neck supple.   Skin:     General: Skin is warm and dry.      Findings: No rash.          Neurological:      Mental Status: He is alert and oriented to person, place, and time. Mental status is at baseline.      Motor: Tremor present.      Comments: Tremor is in the right hand. Right arm with hemiparesis/weakness.    Psychiatric:         Behavior: Behavior normal. Behavior is cooperative.       High risk of death within one year? Unknown- aggressively pursuing disease modifing therapies- has responded well to Opdivo infusions.     Basis for estimate:chart review and clinical assessment    End of Life Discussion completed with the Pt/family: yes (previously)- pt alert, oriented and aware of likely trajectory of disease process. Verbalizes strong desire to be a full code. States, \"I'm not going out easy. I'm kicking and screaming the whole way.\" Pt still wishes to pursue aggressive disease modifying therapies. Not interested in hospice consult. Disease has been very stable on biweekly Opdivo infusions.     Planned Discharge Disposition: Home      ASSESSMENT/PLAN:  COPD  -Trelegy as prescribed  -Duoneb QID  -Proair rescue inhaler PRN  -Discussed  s/s that warrant emergent evaluation  -Follow up with PCP and pulmonologist as directed.     Compression fracture of thoracic vertebrae/Neck pain on right side/vertebral compression fx- back pain/ Cancer-related pain- uncomplicated opioid dependence  Pt admits to chronic pain on the right side of the neck/shoulder. Pt has vertebral compression fx s/p RT- causes neck and thoracic back pain.    -Continue Norco PRN as prescribed- there is no concern for misuse or diversion.    -Lidocaine patch/ointment/Biofreeze PRN  -Medical marijuana as directed by Dr. Jackman  -F/u with onc/neuro sx as directed   -Home based PT/OT  -Massage therapy     Metastatic adenocarcinoma (CMS/HCC)/ Bone and brain metastases/Seizures  Pt with Stage IV Lung CA w/metastases to the brain. S/p RT and cyberknife procedures and craniotomy. Gets Opdivo infusions biweekly  -No seizures while on anti-epileptic agent. Pt stable  -F/u with oncology and neurosurgery as directed.     Opioid-induced constipation  Bowel regimen: Pt takes daily stool softener. Drinks prune juice and using Miralax PRN. Previously recommended Benefiber. Plan: CPM. Ensure adequate fluid intake. Activity as tolerated.    Generalized weakness/Debility/Neoplasm related Fatigue  Pt using cane with SBA ambulation. Socks with rubber  on bottom. Pt activity levels and functional tolerance have been stable lately. Always have person in SBA position.   -Reviewed safety/fall precautions; pt and family verbalize understanding   -CPM with methylphenidate for neoplasm-related fatigue. Weaned down to 5mg daily  -Home based PT/OT previously ordered; pt recently completed a session. Not interested in outpt referral at this time    Advance Care Planning/Goals of Care:    Full Code. Pt's wife Jenn is his POA for healthcare. Document on file.   Goal of care is to pursue aggressive disease modifying interventions- stabilize and prolong life.     Encounter for palliative care  Will continue home  visits Q4 weeks and PRN.   Advised pt/family to contact APRN with new onset pain or worsening of other symptoms.     Symptom Management:   Arthritis right hand/wrist - using topical analgesic (biofreeze). Continue heating pad/gentle massage.   No acute issues - CPM.  f/u with PCP and medical specialists as directed.    Care Coordination/Psychosocial/Spiritual Needs  No spiritual or psychosocial needs identified at this visit. Pt has a very supportive family.     Provided encouragement and decision making support regarding health choices and medical management decisions. Advised pt/family to contact APRN with new onset pain or worsening of other symptoms.    Thank you for involving Palliative Medicine at Home.  Please contact the covering provider via [employer] with further questions or concerns.    Total combined time for today's encounter was 45 minutes, with > 50% of that time spent counseling and coordinating care regarding the above.      Nancy Shin CNP      Note to Patients: The 21st Century Cures Act makes medical notes like these available to patients in the interest of transparency.  Please be advised that this is a medical document. Medical documents are intended to carry relevant information, facts as evident, and the clinical opinion of the practitioner.  The medical note is intended as a peer to peer communication, and may appear blunt or direct. It is written in medical language, and may contain abbreviations and terminology that is unfamiliar.

## 2023-10-23 ENCOUNTER — LAB ENCOUNTER (OUTPATIENT)
Dept: LAB | Age: 33
End: 2023-10-23

## 2023-10-23 ENCOUNTER — OFFICE VISIT (OUTPATIENT)
Dept: FAMILY MEDICINE CLINIC | Facility: CLINIC | Age: 33
End: 2023-10-23

## 2023-10-23 VITALS
RESPIRATION RATE: 17 BRPM | OXYGEN SATURATION: 99 % | WEIGHT: 125 LBS | HEIGHT: 65 IN | TEMPERATURE: 98 F | BODY MASS INDEX: 20.83 KG/M2 | DIASTOLIC BLOOD PRESSURE: 68 MMHG | HEART RATE: 68 BPM | SYSTOLIC BLOOD PRESSURE: 108 MMHG

## 2023-10-23 DIAGNOSIS — Z00.00 ROUTINE PHYSICAL EXAMINATION: Primary | ICD-10-CM

## 2023-10-23 DIAGNOSIS — F41.9 ANXIETY: ICD-10-CM

## 2023-10-23 DIAGNOSIS — Z00.00 ROUTINE PHYSICAL EXAMINATION: ICD-10-CM

## 2023-10-23 LAB
ALBUMIN SERPL-MCNC: 3.7 G/DL (ref 3.4–5)
ALBUMIN/GLOB SERPL: 1.1 {RATIO} (ref 1–2)
ALP LIVER SERPL-CCNC: 67 U/L
ALT SERPL-CCNC: 20 U/L
ANION GAP SERPL CALC-SCNC: 4 MMOL/L (ref 0–18)
AST SERPL-CCNC: 18 U/L (ref 15–37)
BASOPHILS # BLD AUTO: 0.02 X10(3) UL (ref 0–0.2)
BASOPHILS NFR BLD AUTO: 0.3 %
BILIRUB SERPL-MCNC: 0.5 MG/DL (ref 0.1–2)
BUN BLD-MCNC: 6 MG/DL (ref 7–18)
BUN/CREAT SERPL: 9.1 (ref 10–20)
CALCIUM BLD-MCNC: 9.1 MG/DL (ref 8.5–10.1)
CHLORIDE SERPL-SCNC: 112 MMOL/L (ref 98–112)
CHOLEST SERPL-MCNC: 126 MG/DL (ref ?–200)
CO2 SERPL-SCNC: 25 MMOL/L (ref 21–32)
CREAT BLD-MCNC: 0.66 MG/DL
DEPRECATED RDW RBC AUTO: 39.2 FL (ref 35.1–46.3)
EGFRCR SERPLBLD CKD-EPI 2021: 117 ML/MIN/1.73M2 (ref 60–?)
EOSINOPHIL # BLD AUTO: 0.05 X10(3) UL (ref 0–0.7)
EOSINOPHIL NFR BLD AUTO: 0.7 %
ERYTHROCYTE [DISTWIDTH] IN BLOOD BY AUTOMATED COUNT: 12.1 % (ref 11–15)
FASTING PATIENT LIPID ANSWER: YES
FASTING STATUS PATIENT QL REPORTED: YES
GLOBULIN PLAS-MCNC: 3.5 G/DL (ref 2.8–4.4)
GLUCOSE BLD-MCNC: 94 MG/DL (ref 70–99)
HCT VFR BLD AUTO: 41 %
HDLC SERPL-MCNC: 47 MG/DL (ref 40–59)
HGB BLD-MCNC: 13 G/DL
IMM GRANULOCYTES # BLD AUTO: 0.02 X10(3) UL (ref 0–1)
IMM GRANULOCYTES NFR BLD: 0.3 %
LDLC SERPL CALC-MCNC: 66 MG/DL (ref ?–100)
LYMPHOCYTES # BLD AUTO: 1.9 X10(3) UL (ref 1–4)
LYMPHOCYTES NFR BLD AUTO: 27 %
MCH RBC QN AUTO: 28.1 PG (ref 26–34)
MCHC RBC AUTO-ENTMCNC: 31.7 G/DL (ref 31–37)
MCV RBC AUTO: 88.7 FL
MONOCYTES # BLD AUTO: 0.49 X10(3) UL (ref 0.1–1)
MONOCYTES NFR BLD AUTO: 7 %
NEUTROPHILS # BLD AUTO: 4.57 X10 (3) UL (ref 1.5–7.7)
NEUTROPHILS # BLD AUTO: 4.57 X10(3) UL (ref 1.5–7.7)
NEUTROPHILS NFR BLD AUTO: 64.7 %
NONHDLC SERPL-MCNC: 79 MG/DL (ref ?–130)
OSMOLALITY SERPL CALC.SUM OF ELEC: 289 MOSM/KG (ref 275–295)
PLATELET # BLD AUTO: 266 10(3)UL (ref 150–450)
POTASSIUM SERPL-SCNC: 4.1 MMOL/L (ref 3.5–5.1)
PROT SERPL-MCNC: 7.2 G/DL (ref 6.4–8.2)
RBC # BLD AUTO: 4.62 X10(6)UL
SODIUM SERPL-SCNC: 141 MMOL/L (ref 136–145)
TRIGL SERPL-MCNC: 62 MG/DL (ref 30–149)
TSI SER-ACNC: 0.85 MIU/ML (ref 0.36–3.74)
VLDLC SERPL CALC-MCNC: 9 MG/DL (ref 0–30)
WBC # BLD AUTO: 7.1 X10(3) UL (ref 4–11)

## 2023-10-23 PROCEDURE — 3078F DIAST BP <80 MM HG: CPT

## 2023-10-23 PROCEDURE — 3008F BODY MASS INDEX DOCD: CPT

## 2023-10-23 PROCEDURE — 36415 COLL VENOUS BLD VENIPUNCTURE: CPT

## 2023-10-23 PROCEDURE — 85025 COMPLETE CBC W/AUTO DIFF WBC: CPT

## 2023-10-23 PROCEDURE — 3074F SYST BP LT 130 MM HG: CPT

## 2023-10-23 PROCEDURE — 80061 LIPID PANEL: CPT

## 2023-10-23 PROCEDURE — 80053 COMPREHEN METABOLIC PANEL: CPT

## 2023-10-23 PROCEDURE — 84443 ASSAY THYROID STIM HORMONE: CPT

## 2023-10-23 PROCEDURE — 99395 PREV VISIT EST AGE 18-39: CPT

## 2023-10-23 RX ORDER — MULTIVIT-MIN/IRON FUM/FOLIC AC 7.5 MG-4
1 TABLET ORAL DAILY
COMMUNITY

## 2024-03-06 ENCOUNTER — OFFICE VISIT (OUTPATIENT)
Dept: OBGYN CLINIC | Facility: CLINIC | Age: 34
End: 2024-03-06
Payer: COMMERCIAL

## 2024-03-06 VITALS
BODY MASS INDEX: 21.26 KG/M2 | SYSTOLIC BLOOD PRESSURE: 110 MMHG | WEIGHT: 127.63 LBS | DIASTOLIC BLOOD PRESSURE: 68 MMHG | HEIGHT: 65 IN

## 2024-03-06 DIAGNOSIS — Z32.01 PREGNANCY EXAMINATION OR TEST, POSITIVE RESULT (HCC): ICD-10-CM

## 2024-03-06 DIAGNOSIS — N92.6 MISSED MENSES: Primary | ICD-10-CM

## 2024-03-06 DIAGNOSIS — O21.9 NAUSEA AND VOMITING DURING PREGNANCY (HCC): ICD-10-CM

## 2024-03-06 DIAGNOSIS — Z12.4 SCREENING FOR CERVICAL CANCER: ICD-10-CM

## 2024-03-06 LAB
CONTROL LINE PRESENT WITH A CLEAR BACKGROUND (YES/NO): YES YES/NO
KIT LOT #: NORMAL NUMERIC
PREGNANCY TEST, URINE: POSITIVE

## 2024-03-06 PROCEDURE — 87086 URINE CULTURE/COLONY COUNT: CPT | Performed by: OBSTETRICS & GYNECOLOGY

## 2024-03-06 PROCEDURE — 3008F BODY MASS INDEX DOCD: CPT | Performed by: OBSTETRICS & GYNECOLOGY

## 2024-03-06 PROCEDURE — 99204 OFFICE O/P NEW MOD 45 MIN: CPT | Performed by: OBSTETRICS & GYNECOLOGY

## 2024-03-06 PROCEDURE — 87624 HPV HI-RISK TYP POOLED RSLT: CPT | Performed by: OBSTETRICS & GYNECOLOGY

## 2024-03-06 PROCEDURE — 87591 N.GONORRHOEAE DNA AMP PROB: CPT | Performed by: OBSTETRICS & GYNECOLOGY

## 2024-03-06 PROCEDURE — 87491 CHLMYD TRACH DNA AMP PROBE: CPT | Performed by: OBSTETRICS & GYNECOLOGY

## 2024-03-06 PROCEDURE — 81025 URINE PREGNANCY TEST: CPT | Performed by: OBSTETRICS & GYNECOLOGY

## 2024-03-06 PROCEDURE — 3078F DIAST BP <80 MM HG: CPT | Performed by: OBSTETRICS & GYNECOLOGY

## 2024-03-06 PROCEDURE — 3074F SYST BP LT 130 MM HG: CPT | Performed by: OBSTETRICS & GYNECOLOGY

## 2024-03-06 RX ORDER — DIPHENHYDRAMINE HYDROCHLORIDE 25 MG/1
25 CAPSULE ORAL DAILY
Qty: 60 TABLET | Refills: 2 | Status: SHIPPED | OUTPATIENT
Start: 2024-03-06

## 2024-03-06 NOTE — PROGRESS NOTES
[facility]  Obstetrics and Gynecology    Subjective:     Laquita Vizcarra is a 33 year old ,female, Patient's last menstrual period was 2024. presents with missed menses and positive pregnancy test.   This is a planned pregnancy. Partner is involved.  Recent contraception: none  Patient's last menstrual period was 2024. Lmp certain, regular    Menarche: 11 years old (3/6/2024  9:34 AM)  Period Cycle (Days): monthly (3/6/2024  9:34 AM)  Period Duration (Days): 4 days (3/6/2024  9:34 AM)  Period Flow: moderate (3/6/2024  9:34 AM)  Date When Birth Control Last Used: per pt iud used back in  and patch used in  (3/6/2024  9:34 AM)  Hx Prior Abnormal Pap: No (3/6/2024  9:34 AM)  Pap Date: 21 (3/6/2024  9:34 AM)  Pap Result Notes: neg (3/6/2024  9:34 AM)      Nausea/vomiting - positive  Breast tenderness - Positive  Vaginal discharge - Negative  Vaginal bleeding - spotting at time of likely implantation, resolved  Urinary symptoms - Negative  Has not been taking prenatal vitamins due to nausea  Feels safe at home   STD hx: denies  Pap UTD    Review of Systems:  General: no complaints  Eyes: no complaints  Respiratory: no complaints  Cardiovascular: no complaints  GI: no complaints  : no complaints See HPI  Hematological/lymphatic: no complaints  Breast: no complaints  Psychiatric: no complaints  Endocrine:no complaints  Neurological: no complaints  Immunological: no complaints  Musculoskeletal:no complaints    OB History    Para Term  AB Living   3 2 2 0 0 2   SAB IAB Ectopic Multiple Live Births   0 0 0 0 2       History reviewed. No pertinent past medical history.    Past Surgical History:   Procedure Laterality Date    WISDOM TEETH REMOVED         Social History     Socioeconomic History    Marital status: Single   Tobacco Use    Smoking status: Never    Smokeless tobacco: Never   Vaping Use    Vaping Use: Never used   Substance and Sexual Activity     Alcohol use: Yes     Comment: socially. Stopped when found out was pregnant     Drug use: Never    Sexual activity: Yes   Other Topics Concern    Blood Transfusions No       Family History   Problem Relation Age of Onset    Lipids Father     Heart Disorder Father     Colon Cancer Mother     Kidney Disease Mother         IgA disorder    Stroke Maternal Grandmother          Current Outpatient Medications:     Multiple Vitamins-Minerals (MULTI-VITAMIN/MINERALS) Oral Tab, Take 1 tablet by mouth daily., Disp: , Rfl:       Health maintenance:  Health Maintenance   Topic Date Due    COVID-19 Vaccine ( season) 2023    Influenza Vaccine (1) 10/01/2023    Annual Depression Screening  2024    Pap Smear  2024    Annual Physical  10/23/2024    DTaP,Tdap,and Td Vaccines (9 - Td or Tdap) 2032    Pneumococcal Vaccine: Birth to 64yrs  Aged Out        Objective:     Vitals:    24 0935   BP: 110/68   Weight: 127 lb 9.6 oz (57.9 kg)   Height: 65\"         Body mass index is 21.23 kg/m².    GENERAL: well developed, well nourished, in no apparent distress, alert and orientated X 3  PSYCH: mood and affect stable   SKIN: no rashes, no lesions  HEENT: normal  LUNGS: respiration unlabored  CARDIOVASCULAR: no peripheral edema or varicosities, skin warm and dry    ABDOMEN: Soft, non distended; non tender, no masses  GYNE/:   External Genitalia: normal, no lesions, good perineal support  Urethra: meatus normal   Bladder: well supported  Vagina: normal mucosa, no lesions,  discharge   Uterus: normal size, mobile, nontender  Cervix: normal os, no lesions or bleeding  Adnexa:normal size, bilaterally nontender, no palpable masses  Cul-de-sac: normal  R/V: normal perineum, no hemorrhoids  EXTREMITIES:  Normal range of motion, strength 5/5, nontender without edema    Labs:  POC urine pregnancy test : Positive     Imaging:  See report.       Assessment:     Laquita Vizcarra is a 33 year old  female who  presents for missed menses and positive pregnancy test    Patient Active Problem List   Diagnosis    Rh negative state in antepartum period ([facility])         Plan:       ICD-10-CM    1. Missed menses  N92.6 Urine Pregnancy Test      2. Pregnancy examination or test, positive result ([facility])  Z32.01           Patient's last menstrual period was 01/09/2024. at 8/1 weeks, EDC 10/15/24 by last menstrual period  EDC by today's US - 10/15/24    EGA  by last menstrual period c/w lmp    Missed menses  - positive pregnancy test  - US noted for viable IUP at 8/1   - Pt counseled on safety, diet, exercise, caffiene, tobacco, ETOH, sexual activity, ER precautions, diet, exercise, appropriate otc medication use, substance abuse   - Counseled on taking a PNV with folic acid   - genetic screening testing d/w patient and family, pt declines invasive diagnostic testing and considering cell free DNA. Discussed possible out of pocket cost of 300$.    - considering carrier screening has had carrier screening in her past for CF, SMA, and hemoglobinopathies   - advised to follow up to establish prenatal care - referred for RN education visit  - SAB precautions provided   - d/w nausea and vomiting in pregnancy including vitamin B 6 and unisom       All of the findings and plan were discussed with the patient.  She notes understanding and agrees with the plan of care.  All questions were answered to the best of my ability at this time.    RTC in 4 weeks or sooner if needed     45 minutes spent with chart review, obtaining history, performing exam, counseling and coordination of care  MD ASHLEY Zuleta

## 2024-03-07 LAB
C TRACH DNA SPEC QL NAA+PROBE: NEGATIVE
HPV I/H RISK 1 DNA SPEC QL NAA+PROBE: NEGATIVE
N GONORRHOEA DNA SPEC QL NAA+PROBE: NEGATIVE

## 2024-03-17 LAB
LAST PAP RESULT: NORMAL
PAP HISTORY (OTHER THAN LAST PAP): NORMAL

## 2024-03-20 ENCOUNTER — LAB ENCOUNTER (OUTPATIENT)
Dept: LAB | Facility: REFERENCE LAB | Age: 34
End: 2024-03-20
Attending: OBSTETRICS & GYNECOLOGY
Payer: COMMERCIAL

## 2024-03-20 ENCOUNTER — NURSE ONLY (OUTPATIENT)
Dept: OBGYN CLINIC | Facility: CLINIC | Age: 34
End: 2024-03-20
Payer: COMMERCIAL

## 2024-03-20 DIAGNOSIS — Z34.81 ENCOUNTER FOR SUPERVISION OF OTHER NORMAL PREGNANCY IN FIRST TRIMESTER (HCC): ICD-10-CM

## 2024-03-20 DIAGNOSIS — Z34.81 ENCOUNTER FOR SUPERVISION OF OTHER NORMAL PREGNANCY IN FIRST TRIMESTER (HCC): Primary | ICD-10-CM

## 2024-03-20 LAB
ANTIBODY SCREEN: NEGATIVE
BASOPHILS # BLD AUTO: 0.02 X10(3) UL (ref 0–0.2)
BASOPHILS NFR BLD AUTO: 0.2 %
DEPRECATED RDW RBC AUTO: 38 FL (ref 35.1–46.3)
EOSINOPHIL # BLD AUTO: 0.06 X10(3) UL (ref 0–0.7)
EOSINOPHIL NFR BLD AUTO: 0.7 %
ERYTHROCYTE [DISTWIDTH] IN BLOOD BY AUTOMATED COUNT: 12 % (ref 11–15)
HBV SURFACE AG SER-ACNC: <0.1 [IU]/L
HBV SURFACE AG SERPL QL IA: NONREACTIVE
HCT VFR BLD AUTO: 39.1 %
HCV AB SERPL QL IA: NONREACTIVE
HGB BLD-MCNC: 13.1 G/DL
IMM GRANULOCYTES # BLD AUTO: 0.03 X10(3) UL (ref 0–1)
IMM GRANULOCYTES NFR BLD: 0.4 %
LYMPHOCYTES # BLD AUTO: 1.94 X10(3) UL (ref 1–4)
LYMPHOCYTES NFR BLD AUTO: 23.8 %
MCH RBC QN AUTO: 29 PG (ref 26–34)
MCHC RBC AUTO-ENTMCNC: 33.5 G/DL (ref 31–37)
MCV RBC AUTO: 86.5 FL
MONOCYTES # BLD AUTO: 0.65 X10(3) UL (ref 0.1–1)
MONOCYTES NFR BLD AUTO: 8 %
NEUTROPHILS # BLD AUTO: 5.45 X10 (3) UL (ref 1.5–7.7)
NEUTROPHILS # BLD AUTO: 5.45 X10(3) UL (ref 1.5–7.7)
NEUTROPHILS NFR BLD AUTO: 66.9 %
PLATELET # BLD AUTO: 279 10(3)UL (ref 150–450)
RBC # BLD AUTO: 4.52 X10(6)UL
RH BLOOD TYPE: NEGATIVE
RUBV IGG SER QL: POSITIVE
RUBV IGG SER-ACNC: 142.1 IU/ML (ref 10–?)
T PALLIDUM AB SER QL IA: NONREACTIVE
WBC # BLD AUTO: 8.2 X10(3) UL (ref 4–11)

## 2024-03-20 PROCEDURE — 86803 HEPATITIS C AB TEST: CPT

## 2024-03-20 PROCEDURE — 86901 BLOOD TYPING SEROLOGIC RH(D): CPT

## 2024-03-20 PROCEDURE — 86780 TREPONEMA PALLIDUM: CPT

## 2024-03-20 PROCEDURE — 86762 RUBELLA ANTIBODY: CPT

## 2024-03-20 PROCEDURE — 85025 COMPLETE CBC W/AUTO DIFF WBC: CPT

## 2024-03-20 PROCEDURE — 83021 HEMOGLOBIN CHROMOTOGRAPHY: CPT

## 2024-03-20 PROCEDURE — 86900 BLOOD TYPING SEROLOGIC ABO: CPT

## 2024-03-20 PROCEDURE — 87340 HEPATITIS B SURFACE AG IA: CPT

## 2024-03-20 PROCEDURE — 86850 RBC ANTIBODY SCREEN: CPT

## 2024-03-20 PROCEDURE — 83020 HEMOGLOBIN ELECTROPHORESIS: CPT

## 2024-03-20 PROCEDURE — 36415 COLL VENOUS BLD VENIPUNCTURE: CPT

## 2024-03-20 PROCEDURE — 87389 HIV-1 AG W/HIV-1&-2 AB AG IA: CPT

## 2024-03-20 RX ORDER — CHOLECALCIFEROL (VITAMIN D3) 25 MCG
1 TABLET,CHEWABLE ORAL DAILY
COMMUNITY

## 2024-03-20 NOTE — PROGRESS NOTES
Pt is a G 3  P  2 for RN OB Education.       Pregnancy Confirmation apt with: MA    LMP: 2024    US: 2024 @ 8w 1d    Working MALKA:  10/15/2024    Pre  BMI:   21.03    Medical Hx significant for: denies    Obstetrical Hx significant for:  x2    Surgical Hx significant for: wisdom teeth removed    EPDS score: 3    OUD Screening: Patient has answered NO to 5p questions and has no  risk factors.     Patient given \"What Pregnant Women Need to Know\" handout.     Educational material reviewed with patient: Prenatal care, nutrition, weight gain recommendations, travel, exercise, intercourse, pregnancy changes, safe medications, pregnancy and work, fetal movement, labor and  labor, warning signs, food safety, tdap, cord blood, breastfeeding, circumcision, and Group B strep.     Pt agrees to blood transfusion if needed: yes    PN labs ordered     Optional genetic screening discussed.  Pt desires Prequel      [facility] Media Policy: Reviewed and verbalized understanding.     NOB appt: 2024 with DORENE    Lab appt: today    Vaccines: informed    ASA protocol :  n/a    SODH:  completed

## 2024-03-22 ENCOUNTER — TELEPHONE (OUTPATIENT)
Dept: OBGYN CLINIC | Facility: CLINIC | Age: 34
End: 2024-03-22

## 2024-03-22 RX ORDER — METOCLOPRAMIDE 10 MG/1
10 TABLET ORAL EVERY 6 HOURS PRN
Qty: 20 TABLET | Refills: 0 | Status: SHIPPED | OUTPATIENT
Start: 2024-03-22

## 2024-03-22 NOTE — TELEPHONE ENCOUNTER
Incoming call from patient to inform she has a lot of nausea and vomiting and the vitamin B doctor prescribe is not helping with the symptoms .    Patient will like a call back to see what can be done .    Please assist .

## 2024-03-27 LAB
HGB A2 MFR BLD: 2.8 % (ref 1.5–3.5)
HGB PNL BLD ELPH: 97.2 % (ref 95.5–100)

## 2024-03-28 ENCOUNTER — TELEPHONE (OUTPATIENT)
Dept: OBGYN CLINIC | Facility: CLINIC | Age: 34
End: 2024-03-28

## 2024-03-28 NOTE — TELEPHONE ENCOUNTER
Pt contacted,  and name verified. Pt provided lab results , negative prequel, xy. Sex of baby given to pt's sister-in-law, cristhian [phone], as requested by pt.

## 2024-04-06 ENCOUNTER — INITIAL PRENATAL (OUTPATIENT)
Dept: OBGYN CLINIC | Facility: CLINIC | Age: 34
End: 2024-04-06
Payer: COMMERCIAL

## 2024-04-06 VITALS
HEIGHT: 65 IN | DIASTOLIC BLOOD PRESSURE: 64 MMHG | SYSTOLIC BLOOD PRESSURE: 116 MMHG | WEIGHT: 126.81 LBS | BODY MASS INDEX: 21.13 KG/M2

## 2024-04-06 DIAGNOSIS — Z34.81 ENCOUNTER FOR SUPERVISION OF OTHER NORMAL PREGNANCY IN FIRST TRIMESTER (HCC): Primary | ICD-10-CM

## 2024-04-06 PROCEDURE — 3078F DIAST BP <80 MM HG: CPT | Performed by: OBSTETRICS & GYNECOLOGY

## 2024-04-06 PROCEDURE — 3008F BODY MASS INDEX DOCD: CPT | Performed by: OBSTETRICS & GYNECOLOGY

## 2024-04-06 PROCEDURE — 3074F SYST BP LT 130 MM HG: CPT | Performed by: OBSTETRICS & GYNECOLOGY

## 2024-04-06 NOTE — PROGRESS NOTES
[facility]  Obstetrics and Gynecology  History & Physical    CC: Patient is here to establish prenatal care     Subjective:     HPI:  Laquita Vizcarra is a 33 year old  female at 12w4d who presents today to establish prenatal care. Patient reports nausea and vomiting is improving. Reglan has helped. Improving with time. No cramping or bleeding. Knows she is having a baby boy.     LMP: Patient's last menstrual period was 2024.  MALKA:  Estimated Date of Delivery: 10/15/24    OB:  OB History    Para Term  AB Living   3 2 2     2   SAB IAB Ectopic Multiple Live Births         0 2      # Outcome Date GA Lbr Kirill/2nd Weight Sex Delivery Anes PTL Lv   3 Current            2 Term 22 39w4d 13:49 / 00:11 6 lb 5.6 oz (2.88 kg) M NORMAL SPONT EPI N BUD   1 Term 08 40w0d  8 lb 3 oz (3.714 kg) M NORMAL SPONT None N BUD       GYN:   Pap hx- Last Pap 3/6/24: NILM, HPV negative.     PMH: History reviewed. No pertinent past medical history.    PSH:    Past Surgical History:   Procedure Laterality Date    WISDOM TEETH REMOVED         MEDS:  Current Outpatient Medications on File Prior to Visit   Medication Sig Dispense Refill    metoclopramide (REGLAN) 10 MG Oral Tab Take 1 tablet (10 mg total) by mouth every 6 (six) hours as needed. 20 tablet 0    prenatal vitamin with DHA 27-0.8-228 MG Oral Cap Take 1 capsule by mouth daily.      Pyridoxine HCl (VITAMIN B-6) 25 MG Oral Tab Take 1 tablet (25 mg total) by mouth daily. 60 tablet 2    Doxylamine Succinate, Sleep, 25 MG Oral Tab Take 25 mg by mouth nightly as needed (nausea). Can increase to twice daily if needed for nausea 60 tablet 0    Multiple Vitamins-Minerals (MULTI-VITAMIN/MINERALS) Oral Tab Take 1 tablet by mouth daily.       No current facility-administered medications on file prior to visit.       Allergies:    No Known Allergies    Immunizations:  Immunization History   Administered Date(s) Administered    Covid-19  Vaccine Pfizer 30 mcg/0.3 ml 12/30/2020, 01/20/2021, 11/16/2021    DTAP 09/01/1991, 12/01/1991, 04/01/1992, 08/01/1993, 10/01/2005    DTAP INFANRIX 09/01/1991, 12/01/1991, 04/01/1992, 08/01/1993    DTP 02/24/1994    FLULAVAL 6 months & older 0.5 ml Prefilled syringe (85431) 11/01/2021    FLUZONE 6 months and older PFS 0.5 ml ([id number]) 10/23/2018, 10/16/2019    Fluarix 6 Months And Older 0.5 ml prefilled syringe ([id number]) 10/16/2013    Flucelvax 0.5ml Vaccine 10/16/2013    Fluvirin, 3 Years & >, Im 10/23/2012, 03/03/2015    HEP A,Ped/Adol,(2 Dose) 08/08/2012    HEP B 03/01/1998, 04/01/1998, 07/01/1998    HEP B, Ped/Adol 03/01/1998, 04/01/1998, 07/01/1998    Hepatitis A 08/08/2012    IPV 09/01/1991, 12/01/1991, 04/01/1992, 08/01/1993    Influenza 10/12/2020    MMR 09/01/1991, 08/01/1993    Meningococcal Vaccine 08/08/2012    RHO(D) Immune Globulin 03/14/2022, 06/03/2022    TDAP 03/03/2015, 03/30/2022    Td 08/08/2012       Family Hx:      Family History   Problem Relation Age of Onset    Lipids Father     Heart Disorder Father     Colon Cancer Mother     Kidney Disease Mother         IgA disorder    No Known Problems Son     No Known Problems Son     Stroke Maternal Grandmother     No Known Problems Maternal Grandfather     No Known Problems Paternal Grandmother     No Known Problems Paternal Grandfather     No Known Problems Brother        SocialHx:        Social History     Socioeconomic History    Marital status: Single   Tobacco Use    Smoking status: Never    Smokeless tobacco: Never   Vaping Use    Vaping Use: Never used   Substance and Sexual Activity    Alcohol use: Not Currently     Comment: socially. 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    1 son (13 yo - 1/2021)    1 dog (iesha)     Social Determinants of Health     Financial Resource Strain: Low Risk   (3/20/2024)    Financial Resource Strain     Difficulty of Paying Living Expenses: Not hard at all     Med Affordability: No   Food Insecurity: No Food Insecurity (3/20/2024)    Food Insecurity     Food Insecurity: Never true   Transportation Needs: No Transportation Needs (3/20/2024)    Transportation Needs     Lack of Transportation: No   Stress: No Stress Concern Present (3/20/2024)    Stress     Feeling of Stress : No   Housing Stability: Low Risk  (3/20/2024)    Housing Stability     Housing Instability: No     Review of Systems:  General: no complaints  Eyes: no complaints  Respiratory: no complaints  Cardiovascular: no complaints  GI: See HPI  : See HPI  Hematological/lymphatic: no complaints  Breast: no complaints  Psychiatric: no complaints  Endocrine:no complaints  Neurological: no complaints  Immunological: no complaints  Musculoskeletal:no complaints    Objective:     Vitals:    24 1035   BP: 116/64   Weight: 126 lb 12.8 oz (57.5 kg)   Height: 65\"        Exam:   GENERAL: well developed, well nourished, in no apparent distress, alert and orientated X 3  PSYCH: mood and affect stable   SKIN: no rashes, no lesions  HEENT: normal  LUNGS: respiration unlabored  CARDIOVASCULAR: no peripheral edema or varicosities, skin warm and dry  ABDOMEN: Soft, non distended; non tender.   FHR by bedside doppler 160 BPM.   EXTREMITIES:  Normal range of motion, strength 5/5 walking.     Assessment/Plan:     Laquita Vizcarra is a 33 year old  female at 12w4d who presents today to establish prenatal care.    Patient Active Problem List   Diagnosis    Rh negative state in antepartum period (HCC)         Prenatal care  - prenatal labs ordered  - Pap: up to date.   - continue PNV daily  - PETER at 15 weeks.     Genetic Screening tests  - Discussion held with patient about genetic screening: Cell free DNA and amniocentesis.  - Patient offered Amniocentesis and declined.   - Pt desires cell free DNA. Reviewed normal XY.  -  Myriad Carrier screening including Cystic fibrosis, SMA, and hemoglobinopathies: offered and declined.       Patient education  - Pt counseled on safety, diet, exercise, caffiene, tobacco, ETOH, sexual activity, ER precautions, diet, exercise, appropriate weight gain, travel, appropriate otc medication use, substance abuse and pets.  - Counseled on taking a PNV with 0.4mg of folic acid   - Limiting intake of alcohol, coffee, tea, soda, and other foods containing caffeine  - Limit intake of fish to twice weekly to limit mercury exposure    RTC in 3 weeks     Dr. Josue Barton MD    [facility]     This note was created by Dragon voice recognition. Errors in content may be related to improper recognition by the system; efforts to review and correct have been done but errors may still exist. Please be advised the primary purpose of this note is for me to communicate medical care. Standard sentence structure is not always used. Medical terminology and medical abbreviations may be used. There may be grammatical, typographical, and automated fill ins that may have errors missed in proofreading.

## 2024-04-24 ENCOUNTER — TELEPHONE (OUTPATIENT)
Dept: OBGYN CLINIC | Facility: CLINIC | Age: 34
End: 2024-04-24

## 2024-04-24 PROBLEM — Z33.1 LOW RISK PREGNANCY, INCIDENTAL (HCC): Status: ACTIVE | Noted: 2024-04-24

## 2024-04-29 ENCOUNTER — LAB ENCOUNTER (OUTPATIENT)
Dept: LAB | Facility: HOSPITAL | Age: 34
End: 2024-04-29
Attending: OBSTETRICS & GYNECOLOGY
Payer: COMMERCIAL

## 2024-04-29 ENCOUNTER — ROUTINE PRENATAL (OUTPATIENT)
Dept: OBGYN CLINIC | Facility: CLINIC | Age: 34
End: 2024-04-29
Payer: COMMERCIAL

## 2024-04-29 VITALS
HEIGHT: 65 IN | DIASTOLIC BLOOD PRESSURE: 66 MMHG | BODY MASS INDEX: 21.49 KG/M2 | WEIGHT: 129 LBS | SYSTOLIC BLOOD PRESSURE: 114 MMHG

## 2024-04-29 DIAGNOSIS — O09.529 ANTEPARTUM MULTIGRAVIDA OF ADVANCED MATERNAL AGE (HCC): ICD-10-CM

## 2024-04-29 DIAGNOSIS — Z34.80 SUPERVISION OF OTHER NORMAL PREGNANCY, ANTEPARTUM (HCC): Primary | ICD-10-CM

## 2024-04-29 DIAGNOSIS — Z34.80 SUPERVISION OF OTHER NORMAL PREGNANCY, ANTEPARTUM (HCC): ICD-10-CM

## 2024-04-29 PROCEDURE — 36415 COLL VENOUS BLD VENIPUNCTURE: CPT

## 2024-04-29 PROCEDURE — 82105 ALPHA-FETOPROTEIN SERUM: CPT

## 2024-04-29 NOTE — PROGRESS NOTES
Denies pain, bleeding, LOF.   Still with nausea - taking Reglan ATC    34 year old  at 15w6d by last menstrual period      Return OB  Pre- Care: UTD. AFP and level 2 ultrasound ordered  Patient Active Problem List   Diagnosis    Rh negative state in antepartum period (HCC)    Supervision of other normal pregnancy, antepartum (HCC)    Low-risk cell-free DNA, male ()       - Return to clinic in: 4

## 2024-05-01 LAB
AFP MOM: 1.3
AFP VALUE: 47.8 NG/ML
GA ON COLL DATE: 15.9 WEEKS
INSULIN DEP AFP: NO
MAT AGE AT EDD: 36.4 YR
MULTIPLE GEST AFP: NO
OSBR RISK 1 IN AFP: 4803
WEIGHT AFP: 129 LBS

## 2024-05-22 ENCOUNTER — TELEPHONE (OUTPATIENT)
Dept: OBGYN CLINIC | Facility: CLINIC | Age: 34
End: 2024-05-22

## 2024-05-22 NOTE — TELEPHONE ENCOUNTER
Called pt c/o abdominal pain, started yesterday.  Started with feelings of uncomfortable than went to bed and over night pain.  Pt woke up this am and pressure pain comes and goes.  Upper part of abd stomach and spreads down, started at every 5 mins now every 10 mins.  6/10 PS, feels like pressure and aching.  Encouraged to drink plenty of water and void.  Pt denies vaginal bleeding, leaking/gushing fluid.  Informed will send message to her OB.  Pt agrees.    Message sent to MA provider on-call.  Per MA: if worsens then to ED. Thanks.    Called pt informed if pain worsens to go to ED, also provided PTL precautions.  Pt agrees.

## 2024-05-22 NOTE — TELEPHONE ENCOUNTER
Patient has been having some abdominal pain since last night. Would like to know if it is normal.    [address] Price: 0.00 Additional J Code Units: 1 Show Intermediate And Complex Repair Codes?: No How Many Lesions Are You Destroying?: Less than 15 Anticipated Depth And Size Of Soft Tissue Excision (Required): Subcutaneous, Less than 3 cm First Biopsy Type: Tangential Biopsy Number Of Lesions Injected: Less than 8 lesions Anticipated Excised Diameter (Required): 2.1 - 3.0 cm Which Photosensitizer Was Used?: Levulan Fee Schedule Discount In % Off Of Fee Schedule: Standard Fee Schedule as Entered in Pricing Tab Anticipated Depth And Size Of Soft Tissue Excision (Required): Subcutaneous, Less than 2 cm First Procedure You Would Like A Quote For?: Benign Excision Fee Schedule Discount For Cash Pay Patients In % Off Of Fee Schedule: 0 Location (Required): Neck Anticipated Depth And Size Of Soft Tissue Excision (Required): Subcutaneous, Less than 1.5 cm Detail Level: Detailed

## 2024-05-28 ENCOUNTER — ROUTINE PRENATAL (OUTPATIENT)
Dept: OBGYN CLINIC | Facility: CLINIC | Age: 34
End: 2024-05-28

## 2024-05-28 VITALS
DIASTOLIC BLOOD PRESSURE: 68 MMHG | BODY MASS INDEX: 21.77 KG/M2 | WEIGHT: 130.69 LBS | HEIGHT: 65 IN | SYSTOLIC BLOOD PRESSURE: 116 MMHG

## 2024-05-28 DIAGNOSIS — Z34.80 SUPERVISION OF OTHER NORMAL PREGNANCY, ANTEPARTUM (HCC): Primary | ICD-10-CM

## 2024-05-28 DIAGNOSIS — O09.529 ANTEPARTUM MULTIGRAVIDA OF ADVANCED MATERNAL AGE (HCC): ICD-10-CM

## 2024-05-28 PROCEDURE — 3074F SYST BP LT 130 MM HG: CPT | Performed by: OBSTETRICS & GYNECOLOGY

## 2024-05-28 PROCEDURE — 3078F DIAST BP <80 MM HG: CPT | Performed by: OBSTETRICS & GYNECOLOGY

## 2024-05-28 PROCEDURE — 3008F BODY MASS INDEX DOCD: CPT | Performed by: OBSTETRICS & GYNECOLOGY

## 2024-05-28 NOTE — PROGRESS NOTES
Denies pain, bleeding, LOF. Positive fetal movement     34 year old  at 20w0d by last menstrual period      Return OB  Pre- Care: UTD.  Glucola and cbc orders placed for next visit.  Patient Active Problem List   Diagnosis    Rh negative state in antepartum period ([facility])    Supervision of other normal pregnancy, antepartum ([facility])    Low-risk cell-free DNA, male ()       - Return to clinic in: 4

## 2024-06-01 NOTE — PROGRESS NOTES
Outpatient Maternal-Fetal Medicine Consultation    Dear Dr. Noland,    Thank you for requesting ultrasound evaluation and maternal fetal medicine consultation on your patient Laquita Vizcarra.  As you are aware she is a 34 year old female with a Mendoza pregnancy at 21w1d.  A maternal-fetal medicine consultation was requested secondary to advanced maternal age.  Her prenatal records and labs were reviewed.    She had a low risk cell free DNA screen.    HISTORY  OB History    Para Term  AB Living   3 2 2 0 0 2   SAB IAB Ectopic Multiple Live Births   0 0 0 0 2     # 1 - Date: 08, Sex: Male, Weight: 8 lb 3 oz (3.714 kg), GA: 40w0d, Type: Normal spontaneous vaginal delivery, Apgar1: None, Apgar5: None, Living: Living, Birth Comments: None    # 2 - Date: 22, Sex: Male, Weight: 6 lb 5.6 oz (2.88 kg), GA: 39w4d, Type: Normal spontaneous vaginal delivery, Apgar1: 9, Apgar5: 9, Living: Living, Birth Comments: None    # 3 - Date: None, Sex: None, Weight: None, GA: None, Type: None, Apgar1: None, Apgar5: None, Living: None, Birth Comments: None    Past Medical History  The patient  has no past medical history on file.    Past Surgical History  The patient  has a past surgical history that includes wisdom teeth removed ().    Family History  The patient She indicated that her mother is . She indicated that her father is alive. She indicated that her brother is alive. She indicated that her maternal grandmother is . She indicated that her maternal grandfather is alive. She indicated that her paternal grandmother is . She indicated that her paternal grandfather is . She indicated that both of her sons are alive.      Medications:   Current Outpatient Medications:     metoclopramide (REGLAN) 10 MG Oral Tab, Take 1 tablet (10 mg total) by mouth every 6 (six) hours as needed. (Patient not taking: Reported on 2024), Disp: 20 tablet, Rfl: 0    prenatal vitamin with DHA  27-0.8-228 MG Oral Cap, Take 1 capsule by mouth daily., Disp: , Rfl:     Pyridoxine HCl (VITAMIN B-6) 25 MG Oral Tab, Take 1 tablet (25 mg total) by mouth daily. (Patient not taking: Reported on 5/28/2024), Disp: 60 tablet, Rfl: 2    Doxylamine Succinate, Sleep, 25 MG Oral Tab, Take 25 mg by mouth nightly as needed (nausea). Can increase to twice daily if needed for nausea (Patient not taking: Reported on 5/28/2024), Disp: 60 tablet, Rfl: 0    Multiple Vitamins-Minerals (MULTI-VITAMIN/MINERALS) Oral Tab, Take 1 tablet by mouth daily. (Patient not taking: Reported on 5/28/2024), Disp: , Rfl:   Allergies: No Known Allergies      PHYSICAL EXAMINATION:  BP 99/62   Pulse 79   Wt 130 lb (59 kg)   LMP 01/09/2024   BMI 21.63 kg/m²   General: alert and oriented,no acute distress  Abdomen: gravid, soft, non-tender  Extremities: non-tender, no edema        OBSTETRIC ULTRASOUND  The patient had a level 2 ultrasound today which I interpreted the results and reviewed them with the patient.    Ultrasound Findings:  Single IUP in breech presentation.    Placenta is anterior, high.   A 3 vessel cord is noted.  Cardiac activity is present at 144 bpm   g ( 1 lb 0 oz);   MVP is 4.2 cm .       The fetal measurements are consistent with the established EDC. No ultrasound evidence of structural abnormalities are seen today. The nasal bone is present. No ultrasound evidence of markers for aneuploidy are seen. She understands that ultrasound exam cannot exclude genetic abnormalities and that genetic testing is recommended. The limitations of ultrasound were discussed.     Uterus and adnexa appeared normal  today on US    See imaging tab for the complete US report.    DISCUSSION  During her visit we discussed and reviewed the following issues:  ADVANCED MATERNAL AGE    Background  I reviewed with the patient that pregnancies in women of advanced maternal age (35 or older at delivery) are associated with elevated risks.   Specifically, there is a higher rate of:    Fetal malformations  Preeclampsia  Gestational diabetes  Intrauterine fetal death    As a result, enhanced pregnancy surveillance is advised for these patients including a comprehensive ultrasound to assess for fetal malformations  (at 20 weeks) and a third trimester ultrasound assessment for fetal growth (at 32 weeks).  In addition, weekly NST's (initiating at 36 weeks gestation for women 35-39 years and at 32 weeks gestation for women 40 years and older) are also advised.  Routine obstetric care is more than adequate to assess for gestational diabetes and preeclampsia; hence, no further significant alterations in obstetric care are advised.    Fetal Aneuploidy    We also discussed the increased risk of chromosomal abnormalities associated with advanced maternal age.  I reviewed that an ultrasound examination cannot reliably exclude potential genetic abnormalities.     Invasive Testing    I offered invasive genetic testing (amniocentesis, chorionic villus sampling) after reviewing the diagnostic accuracy of these tests as well as the procedure associated loss rate (1:500 for genetic amniocentesis).    She ultimately does not desire invasive genetic testing.     Non-invasive Pregnancy Testing (NIPT)    I reviewed current non-invasive screening options.  Currently non-invasive pregnancy testing (NIPT) offers the highest detection rate (with the lowest false positive rate) for the detection of fetal aneuploidy amongst high-risk patients.  The limitations of detailed mid-trimester sonography was reviewed with the patient.  First trimester screening and second trimester multiple-marker serum serum screening as alternative aneuploidy screening options were also reviewed.  However, both of these tests are associated with lower detection and higher false positive rates.    The patient has already obtained a low-risk  NPIT result and was appropriately reassured.     We discussed  the recommended plan of care based on her  risk factors.  Laquita had her questions answered to her satisfaction..    IMPRESSION:  IUP at 21w1d  Normal level 2 ultrasound  Advanced maternal age, low risk cell free DNA screening.  Invasive testing appropriately declined    RECOMMENDATIONS:  Continue care with Dr. Noland  Weekly NST at 36 weeks  Follow-up growth and BPP at 32 weeks    Total time spent 40 minutes this calendar day which includes preparing to see the patient including chart review, obtaining and/or reviewing additional medical history, performing a physical exam and evaluation, documenting clinical information in the electronic medical record, independently interpreting results, counseling the patient, communicating results to the patient/family/caregiver and coordinating care.     Case discussed with patient who demonstrated understanding and agreement with plan.     Thank you for allowing me to participate in the care of this patient.  Please feel free to contact me with any questions.    Colleen Enamorado MD  Maternal-Fetal Medicine       Note to patient and family:  The 21st Century Cures Act makes medical notes available to patients in the interest of transparency.  However, please be advised that this is a medical document.  It is intended as a peer to peer communication.  It is written in medical language and may contain abbreviations or verbiage that are technical and unfamiliar.  It may appear blunt or direct.  Medical documents are intended to carry relevant information, facts as evident, and the clinical opinion of the practitioner.

## 2024-06-05 ENCOUNTER — HOSPITAL ENCOUNTER (OUTPATIENT)
Dept: PERINATAL CARE | Facility: HOSPITAL | Age: 34
Discharge: HOME | End: 2024-06-05
Attending: OBSTETRICS & GYNECOLOGY
Payer: COMMERCIAL

## 2024-06-05 VITALS
HEART RATE: 79 BPM | SYSTOLIC BLOOD PRESSURE: 99 MMHG | BODY MASS INDEX: 22 KG/M2 | DIASTOLIC BLOOD PRESSURE: 62 MMHG | WEIGHT: 130 LBS

## 2024-06-05 DIAGNOSIS — O09.529 ANTEPARTUM MULTIGRAVIDA OF ADVANCED MATERNAL AGE (HCC): ICD-10-CM

## 2024-06-05 DIAGNOSIS — O09.522 AMA (ADVANCED MATERNAL AGE) MULTIGRAVIDA 35+, SECOND TRIMESTER (HCC): ICD-10-CM

## 2024-06-05 DIAGNOSIS — O09.522 AMA (ADVANCED MATERNAL AGE) MULTIGRAVIDA 35+, SECOND TRIMESTER (HCC): Primary | ICD-10-CM

## 2024-06-05 PROCEDURE — 76811 OB US DETAILED SNGL FETUS: CPT | Performed by: OBSTETRICS & GYNECOLOGY

## 2024-06-25 ENCOUNTER — ROUTINE PRENATAL (OUTPATIENT)
Dept: OBGYN CLINIC | Facility: CLINIC | Age: 34
End: 2024-06-25

## 2024-06-25 ENCOUNTER — LAB ENCOUNTER (OUTPATIENT)
Dept: LAB | Age: 34
End: 2024-06-25
Attending: OBSTETRICS & GYNECOLOGY

## 2024-06-25 ENCOUNTER — TELEPHONE (OUTPATIENT)
Dept: OBGYN CLINIC | Facility: CLINIC | Age: 34
End: 2024-06-25

## 2024-06-25 VITALS
DIASTOLIC BLOOD PRESSURE: 68 MMHG | SYSTOLIC BLOOD PRESSURE: 112 MMHG | BODY MASS INDEX: 22.19 KG/M2 | HEIGHT: 65 IN | WEIGHT: 133.19 LBS

## 2024-06-25 DIAGNOSIS — Z34.80 SUPERVISION OF OTHER NORMAL PREGNANCY, ANTEPARTUM (HCC): ICD-10-CM

## 2024-06-25 DIAGNOSIS — Z36.9 UNSPECIFIED ANTENATAL SCREENING (HCC): Primary | ICD-10-CM

## 2024-06-25 LAB
BASOPHILS # BLD AUTO: 0.03 X10(3) UL (ref 0–0.2)
BASOPHILS NFR BLD AUTO: 0.3 %
DEPRECATED RDW RBC AUTO: 39.8 FL (ref 35.1–46.3)
EOSINOPHIL # BLD AUTO: 0.06 X10(3) UL (ref 0–0.7)
EOSINOPHIL NFR BLD AUTO: 0.5 %
ERYTHROCYTE [DISTWIDTH] IN BLOOD BY AUTOMATED COUNT: 12.5 % (ref 11–15)
GLUCOSE 1H P GLC SERPL-MCNC: 104 MG/DL
HCT VFR BLD AUTO: 36.5 %
HGB BLD-MCNC: 12.4 G/DL
IMM GRANULOCYTES # BLD AUTO: 0.08 X10(3) UL (ref 0–1)
IMM GRANULOCYTES NFR BLD: 0.7 %
LYMPHOCYTES # BLD AUTO: 2.21 X10(3) UL (ref 1–4)
LYMPHOCYTES NFR BLD AUTO: 20.2 %
MCH RBC QN AUTO: 29.5 PG (ref 26–34)
MCHC RBC AUTO-ENTMCNC: 34 G/DL (ref 31–37)
MCV RBC AUTO: 86.7 FL
MONOCYTES # BLD AUTO: 0.72 X10(3) UL (ref 0.1–1)
MONOCYTES NFR BLD AUTO: 6.6 %
NEUTROPHILS # BLD AUTO: 7.82 X10 (3) UL (ref 1.5–7.7)
NEUTROPHILS # BLD AUTO: 7.82 X10(3) UL (ref 1.5–7.7)
NEUTROPHILS NFR BLD AUTO: 71.7 %
PLATELET # BLD AUTO: 260 10(3)UL (ref 150–450)
RBC # BLD AUTO: 4.21 X10(6)UL
WBC # BLD AUTO: 10.9 X10(3) UL (ref 4–11)

## 2024-06-25 PROCEDURE — 36415 COLL VENOUS BLD VENIPUNCTURE: CPT

## 2024-06-25 PROCEDURE — 82950 GLUCOSE TEST: CPT

## 2024-06-25 PROCEDURE — 3008F BODY MASS INDEX DOCD: CPT | Performed by: OBSTETRICS & GYNECOLOGY

## 2024-06-25 PROCEDURE — 85025 COMPLETE CBC W/AUTO DIFF WBC: CPT

## 2024-06-25 PROCEDURE — 3074F SYST BP LT 130 MM HG: CPT | Performed by: OBSTETRICS & GYNECOLOGY

## 2024-06-25 PROCEDURE — 3078F DIAST BP <80 MM HG: CPT | Performed by: OBSTETRICS & GYNECOLOGY

## 2024-06-25 NOTE — PROGRESS NOTES
Doing well. No OB complaints. +FM.   Reviewed [facility] level 2 ultrasound as normal.   Feeling better with nausea but starting to feel the pregnancy growth.   Encouraged to complete 1h GTT and CBC ASAP.     PETER 4 weeks.     Dr. Josue Barton MD    [facility]     This note was created by [employer] voice recognition. Errors in content may be related to improper recognition by the system; efforts to review and correct have been done but errors may still exist. Please be advised the primary purpose of this note is for me to communicate medical care. Standard sentence structure is not always used. Medical terminology and medical abbreviations may be used. There may be grammatical, typographical, and automated fill ins that may have errors missed in proofreading.

## 2024-07-24 ENCOUNTER — ROUTINE PRENATAL (OUTPATIENT)
Dept: OBGYN CLINIC | Facility: CLINIC | Age: 34
End: 2024-07-24
Payer: COMMERCIAL

## 2024-07-24 VITALS
WEIGHT: 137.69 LBS | DIASTOLIC BLOOD PRESSURE: 68 MMHG | SYSTOLIC BLOOD PRESSURE: 100 MMHG | HEIGHT: 65 IN | BODY MASS INDEX: 22.94 KG/M2

## 2024-07-24 DIAGNOSIS — O26.899 RH NEGATIVE STATE IN ANTEPARTUM PERIOD (HCC): Chronic | ICD-10-CM

## 2024-07-24 DIAGNOSIS — Z67.91 RH NEGATIVE STATE IN ANTEPARTUM PERIOD (HCC): Chronic | ICD-10-CM

## 2024-07-24 DIAGNOSIS — Z34.80 SUPERVISION OF OTHER NORMAL PREGNANCY, ANTEPARTUM (HCC): Primary | ICD-10-CM

## 2024-07-24 DIAGNOSIS — O21.9 NAUSEA AND VOMITING DURING PREGNANCY (HCC): ICD-10-CM

## 2024-07-24 PROCEDURE — 3074F SYST BP LT 130 MM HG: CPT | Performed by: OBSTETRICS & GYNECOLOGY

## 2024-07-24 PROCEDURE — 96372 THER/PROPH/DIAG INJ SC/IM: CPT | Performed by: OBSTETRICS & GYNECOLOGY

## 2024-07-24 PROCEDURE — 90715 TDAP VACCINE 7 YRS/> IM: CPT | Performed by: OBSTETRICS & GYNECOLOGY

## 2024-07-24 PROCEDURE — 3078F DIAST BP <80 MM HG: CPT | Performed by: OBSTETRICS & GYNECOLOGY

## 2024-07-24 PROCEDURE — 90471 IMMUNIZATION ADMIN: CPT | Performed by: OBSTETRICS & GYNECOLOGY

## 2024-07-24 PROCEDURE — 81514 NFCT DS BV&VAGINITIS DNA ALG: CPT | Performed by: OBSTETRICS & GYNECOLOGY

## 2024-07-24 PROCEDURE — 3008F BODY MASS INDEX DOCD: CPT | Performed by: OBSTETRICS & GYNECOLOGY

## 2024-07-24 NOTE — PROGRESS NOTES
at 28 W  Glucola/CBC complete  Rhogam and TDAP today.   32 W usn ordered  + c/o yellow green vaginal discharge. Vaginal swab sent.

## 2024-07-29 LAB
BV BACTERIA DNA VAG QL NAA+PROBE: POSITIVE
C GLABRATA DNA VAG QL NAA+PROBE: NEGATIVE
C KRUSEI DNA VAG QL NAA+PROBE: NEGATIVE
CANDIDA DNA VAG QL NAA+PROBE: POSITIVE
T VAGINALIS DNA VAG QL NAA+PROBE: NEGATIVE

## 2024-08-21 ENCOUNTER — HOSPITAL ENCOUNTER (OUTPATIENT)
Dept: PERINATAL CARE | Facility: HOSPITAL | Age: 34
Discharge: HOME | End: 2024-08-21
Attending: OBSTETRICS & GYNECOLOGY
Payer: COMMERCIAL

## 2024-08-21 VITALS
DIASTOLIC BLOOD PRESSURE: 67 MMHG | HEART RATE: 93 BPM | SYSTOLIC BLOOD PRESSURE: 110 MMHG | WEIGHT: 138 LBS | BODY MASS INDEX: 23 KG/M2

## 2024-08-21 DIAGNOSIS — O09.523 AMA (ADVANCED MATERNAL AGE) MULTIGRAVIDA 35+, THIRD TRIMESTER (HCC): ICD-10-CM

## 2024-08-21 DIAGNOSIS — O09.523 AMA (ADVANCED MATERNAL AGE) MULTIGRAVIDA 35+, THIRD TRIMESTER (HCC): Primary | ICD-10-CM

## 2024-08-21 PROCEDURE — 76819 FETAL BIOPHYS PROFIL W/O NST: CPT

## 2024-08-21 PROCEDURE — 76816 OB US FOLLOW-UP PER FETUS: CPT | Performed by: OBSTETRICS & GYNECOLOGY

## 2024-08-21 NOTE — PROGRESS NOTES
Outpatient Maternal-Fetal Medicine Consultation    Dear Dr. Noland,    Thank you for requesting ultrasound evaluation and maternal fetal medicine consultation on your patient Laquita Vizcarra.  As you are aware she is a 34 year old female with a Mendoza pregnancy.  A maternal-fetal medicine consultation was requested secondary to advanced maternal age.  Her prenatal records and labs were reviewed.    She had a low risk cell free DNA screen.  Doing well.  No complaints.    HISTORY  OB History    Para Term  AB Living   3 2 2 0 0 2   SAB IAB Ectopic Multiple Live Births   0 0 0 0 2     # 1 - Date: 08, Sex: Male, Weight: 8 lb 3 oz (3.714 kg), GA: 40w0d, Type: Normal spontaneous vaginal delivery, Apgar1: None, Apgar5: None, Living: Living, Birth Comments: None    # 2 - Date: 22, Sex: Male, Weight: 6 lb 5.6 oz (2.88 kg), GA: 39w4d, Type: Normal spontaneous vaginal delivery, Apgar1: 9, Apgar5: 9, Living: Living, Birth Comments: None    # 3 - Date: None, Sex: None, Weight: None, GA: None, Type: None, Apgar1: None, Apgar5: None, Living: None, Birth Comments: None    Past Medical History  The patient  has no past medical history on file.    Past Surgical History  The patient  has a past surgical history that includes wisdom teeth removed ().    Family History  The patient She indicated that her mother is . She indicated that her father is alive. She indicated that her brother is alive. She indicated that her maternal grandmother is . She indicated that her maternal grandfather is alive. She indicated that her paternal grandmother is . She indicated that her paternal grandfather is . She indicated that both of her sons are alive.      Medications:   Current Outpatient Medications:     metoclopramide (REGLAN) 10 MG Oral Tab, Take 1 tablet (10 mg total) by mouth every 6 (six) hours as needed., Disp: 20 tablet, Rfl: 0    prenatal vitamin with DHA 27-0.8-228 MG Oral Cap,  Take 1 capsule by mouth daily., Disp: , Rfl:     Pyridoxine HCl (VITAMIN B-6) 25 MG Oral Tab, Take 1 tablet (25 mg total) by mouth daily., Disp: 60 tablet, Rfl: 2    Doxylamine Succinate, Sleep, 25 MG Oral Tab, Take 25 mg by mouth nightly as needed (nausea). Can increase to twice daily if needed for nausea, Disp: 60 tablet, Rfl: 0    Multiple Vitamins-Minerals (MULTI-VITAMIN/MINERALS) Oral Tab, Take 1 tablet by mouth daily., Disp: , Rfl:   Allergies: No Known Allergies      PHYSICAL EXAMINATION:  /67   Pulse 93   Wt 138 lb (62.6 kg)   LMP 2024   BMI 22.96 kg/m²      General: alert and oriented,no acute distress  Abdomen: gravid, soft, non-tender  Extremities: non-tender, no edema        DISCUSSION  During her visit we discussed and reviewed the following issues:  ADVANCED MATERNAL AGE    Background  I reviewed with the patient that pregnancies in women of advanced maternal age (35 or older at delivery) are associated with elevated risks.  Specifically, there is a higher rate of:    Fetal malformations  Preeclampsia  Gestational diabetes  Intrauterine fetal death      The patient has already obtained a low-risk  NPIT result and was appropriately reassured.     We discussed the recommended plan of care based on her  risk factors.  Laquita had her questions answered to her satisfaction..        OB ULTRASOUND REPORT   See imaging tab for complete ultrasound report or in PACS    Single IUP in cephalic presentation.    Placenta is anterior.   A 3 vessel cord is noted.  Cardiac activity is present at 156 bpm  EFW 1986 g ( 4 lb 6 oz); 52%.    PEMA is  9.1 cm.  MVP is 4.7 cm      BIOPHYSICAL PROFILE:  Movement:    2/2  Tone:            2/2  Breathin/2  Fluid:             2/2  TOTAL:             IMPRESSION:  IUP at 32w1d   Normal growth ultrasound  Advanced maternal age, low risk cell free DNA screening.  Invasive testing appropriately declined    RECOMMENDATIONS:  Continue care with   [place]  Weekly NST at 36 weeks      Total time spent 20 minutes this calendar day which includes preparing to see the patient including chart review, obtaining and/or reviewing additional medical history, performing a physical exam and evaluation, documenting clinical information in the electronic medical record, independently interpreting results, counseling the patient, communicating results to the patient/family/caregiver and coordinating care.     Case discussed with patient who demonstrated understanding and agreement with plan.     Thank you for allowing me to participate in the care of this patient.  Please feel free to contact me with any questions.    Viktor Becker D.O.  Maternal Fetal Medicine        Note to patient and family:  The 21st Century Cures Act makes medical notes available to patients in the interest of transparency.  However, please be advised that this is a medical document.  It is intended as a peer to peer communication.  It is written in medical language and may contain abbreviations or verbiage that are technical and unfamiliar.  It may appear blunt or direct.  Medical documents are intended to carry relevant information, facts as evident, and the clinical opinion of the practitioner.

## 2024-08-29 ENCOUNTER — ROUTINE PRENATAL (OUTPATIENT)
Dept: OBGYN CLINIC | Facility: CLINIC | Age: 34
End: 2024-08-29
Payer: COMMERCIAL

## 2024-08-29 VITALS
WEIGHT: 139 LBS | DIASTOLIC BLOOD PRESSURE: 58 MMHG | HEIGHT: 65 IN | BODY MASS INDEX: 23.16 KG/M2 | SYSTOLIC BLOOD PRESSURE: 102 MMHG

## 2024-08-29 DIAGNOSIS — O09.529 ANTEPARTUM MULTIGRAVIDA OF ADVANCED MATERNAL AGE (HCC): ICD-10-CM

## 2024-08-29 DIAGNOSIS — B37.9 YEAST INFECTION: ICD-10-CM

## 2024-08-29 DIAGNOSIS — Z34.80 SUPERVISION OF OTHER NORMAL PREGNANCY, ANTEPARTUM (HCC): Primary | ICD-10-CM

## 2024-08-29 PROCEDURE — 3074F SYST BP LT 130 MM HG: CPT | Performed by: OBSTETRICS & GYNECOLOGY

## 2024-08-29 PROCEDURE — 3078F DIAST BP <80 MM HG: CPT | Performed by: OBSTETRICS & GYNECOLOGY

## 2024-08-29 PROCEDURE — 3008F BODY MASS INDEX DOCD: CPT | Performed by: OBSTETRICS & GYNECOLOGY

## 2024-08-29 RX ORDER — FLUCONAZOLE 150 MG/1
150 TABLET ORAL
Qty: 3 TABLET | Refills: 0 | Status: SHIPPED | OUTPATIENT
Start: 2024-08-29

## 2024-08-29 NOTE — PROGRESS NOTES
Denies pain, bleeding, LOF. Positive fetal movement     Itching and discharge returned after being tx or bv and yeast lat month.    PE:   + thick chunky discharge.    34 year old  at 33w2d by last menstrual period      Return OB  Pre- Care: UTD.  - MFM  24: vertex, 1986g 52%    Yeast infection  - Tx empirically for yeast, vikor swab collected.      Medications    fluconazole 150 MG Oral Tab     Request for sterilization  - Likely plan for laparoscopic salpingectomy postpartum. Recommend early pp appt (4 week) so can schedule surgery for sterilization  Patient Active Problem List   Diagnosis    Rh negative state in antepartum period (HCC)    Supervision of other normal pregnancy, antepartum (HCC)    Low-risk cell-free DNA, male ()       - Return to clinic in: 2 weeks    Jenna Salazar DO

## 2024-09-05 ENCOUNTER — TELEPHONE (OUTPATIENT)
Dept: OBGYN CLINIC | Facility: CLINIC | Age: 34
End: 2024-09-05

## 2024-09-05 RX ORDER — METRONIDAZOLE 500 MG/1
500 TABLET ORAL 2 TIMES DAILY
Qty: 14 TABLET | Refills: 0 | Status: SHIPPED | OUTPATIENT
Start: 2024-09-05 | End: 2024-09-12

## 2024-09-05 RX ORDER — FLUCONAZOLE 150 MG/1
TABLET ORAL
Qty: 1 TABLET | Refills: 0 | Status: SHIPPED | OUTPATIENT
Start: 2024-09-05

## 2024-09-05 NOTE — TELEPHONE ENCOUNTER
Pt contacted,  and name verified. Pt provided lab Vikor results and message from provider. Medications sent.

## 2024-09-08 NOTE — PROGRESS NOTES
Denies pain, bleeding, LOF. Positive fetal movement         34 year old  at 34w6d by last menstrual period      Return OB  Pre- Care: UTD. Patient considering elective induction of labor after 39 weeks  - GBS next visit  - HIV, trep orders placed  - MFM US 24: vertex, 1986g 52%      Confirm presentation by ultrasound next visit if needed    Request for sterilization  - Likely plan for laparoscopic salpingectomy postpartum. Recommend early pp appt (4 week) so can schedule surgery for sterilization  Patient Active Problem List   Diagnosis    Rh negative state in antepartum period ([facility])    Supervision of other normal pregnancy, antepartum ([facility])    Low-risk cell-free DNA, male ()       - Return to clinic in: 2 weeks    Desire Noland MD

## 2024-09-09 ENCOUNTER — ROUTINE PRENATAL (OUTPATIENT)
Dept: OBGYN CLINIC | Facility: CLINIC | Age: 34
End: 2024-09-09
Payer: COMMERCIAL

## 2024-09-09 VITALS
DIASTOLIC BLOOD PRESSURE: 62 MMHG | SYSTOLIC BLOOD PRESSURE: 110 MMHG | WEIGHT: 141.81 LBS | BODY MASS INDEX: 23.63 KG/M2 | HEIGHT: 65 IN

## 2024-09-09 DIAGNOSIS — Z34.80 SUPERVISION OF OTHER NORMAL PREGNANCY, ANTEPARTUM (HCC): Primary | ICD-10-CM

## 2024-09-15 ENCOUNTER — HOSPITAL ENCOUNTER (OUTPATIENT)
Facility: HOSPITAL | Age: 34
Discharge: HOME | End: 2024-09-15
Attending: OBSTETRICS & GYNECOLOGY | Admitting: OBSTETRICS & GYNECOLOGY
Payer: COMMERCIAL

## 2024-09-15 ENCOUNTER — HOSPITAL ENCOUNTER (EMERGENCY)
Facility: HOSPITAL | Age: 34
Discharge: STILL ADMITTED OR ADMINISTRATIVE | End: 2024-09-15
Payer: COMMERCIAL

## 2024-09-15 VITALS
HEART RATE: 79 BPM | SYSTOLIC BLOOD PRESSURE: 111 MMHG | RESPIRATION RATE: 16 BRPM | DIASTOLIC BLOOD PRESSURE: 56 MMHG | WEIGHT: 142 LBS | BODY MASS INDEX: 24 KG/M2

## 2024-09-15 PROCEDURE — 59025 FETAL NON-STRESS TEST: CPT | Performed by: OBSTETRICS & GYNECOLOGY

## 2024-09-16 NOTE — TRIAGE
[facility]  part of [facility]      Triage Note    Laquita Vizcarra Patient Status:  Outpatient    1990 MRN [id number]   Location [facility] Attending Josue Barton MD   Hosp Day # 0 PCP Teofilo Sanz MD      Para:   Estimated Date of Delivery: 10/15/24  Gestation: 35w5d    Chief Complaint    Vaginal Bleeding         Allergies:  No Known Allergies    No orders of the defined types were placed in this encounter.      Lab Results   Component Value Date    WBC 10.9 2024    HGB 12.4 2024    HCT 36.5 2024    .0 2024    CREATSERUM 0.66 10/23/2023    BUN 6 (L) 10/23/2023     10/23/2023    K 4.1 10/23/2023     10/23/2023    CO2 25.0 10/23/2023    GLU 94 10/23/2023    CA 9.1 10/23/2023    ALB 3.7 10/23/2023    ALKPHO 67 10/23/2023    BILT 0.5 10/23/2023    TP 7.2 10/23/2023    AST 18 10/23/2023    ALT 20 10/23/2023    TSH 0.854 10/23/2023       Clinitek UA  Lab Results   Component Value Date    SPECGRAVITY 1.020 2022    URINECUL No Growth at 18-24 hrs. 2024       UA  Lab Results   Component Value Date    SPECGRAVITY 1.020 2022    NITRITE Negative 2022       Vitals:    09/15/24 2111 09/15/24 2115   BP:  111/56   Pulse:  79   Resp:  16   Weight: 64.4 kg (142 lb)        NST  Variability: Moderate           Accelerations: Yes           Decelerations: None            Baseline: 145 BPM           Uterine Irritability: No           Contractions: Irregular                    Contraction Frequency: x2 patient did not feel either contraction, abdomen soft                   Acoustic Stimulator: No           Nonstress Test Interpretation: Reactive           Nonstress Test Second Interpretation: Reactive          FHR Category: Category I             Additional Comments   Patient came in with complaints of spotting when she wiped about an hour ago. Patient states she has been doing a lot today and was lifting  and moving things around getting ready for the baby to come. Pad that patient wore to hospital had a few tiny dots of blood on it. Speculum performed and no blood noted in vagina. Q-tip swabs came out clean without any blood on them either. NST reactive and patient states good fetal movement. Dr. Barton called and given report. Discharge order received. Patient discharged home in stable condition.    Chief Complaint   Patient presents with    Vaginal Bleeding     Patient noticed some spotting when she wiped after using the bathroom and then she had some red spotting on the pad she put on.          Leah KATE RN  9/15/2024 9:38 PM

## 2024-09-16 NOTE — PROGRESS NOTES
Pt is a 34 year old female admitted to TR1/TR1-A.     Chief Complaint   Patient presents with    Vaginal Bleeding     Patient noticed some spotting when she wiped after using the bathroom and then she had some red spotting on the pad she put on.       Pt is  35w5d intra-uterine pregnancy.  History obtained, consents signed. Oriented to room, staff, and plan of care.

## 2024-09-18 PROBLEM — O46.93 VAGINAL BLEEDING IN PREGNANCY, THIRD TRIMESTER (HCC): Status: ACTIVE | Noted: 2024-09-18

## 2024-09-18 NOTE — NST
Nonstress Test   Patient: Laquita Vizcarra    Gestation: 36w1d    Diagnosis from order: Vaginal bleeding     Problem List:   Patient Active Problem List   Diagnosis    Rh negative state in antepartum period ([facility])    Supervision of other normal pregnancy, antepartum ([facility])    Low-risk cell-free DNA, male (2014)       NST:        9/15/2024   NST DOCUMENTATION   Variability 6-25 BPM   Accelerations Yes   Decelerations None   Baseline 145 BPM   Uterine Irritability No   Contractions Irregular   Contraction Frequency x2 patient did not feel either contraction, abdomen soft   Acoustic Stimulator No   Nonstress Test Interpretation Reactive   Nonstress Test Second Interpretation Reactive   FHR Category Category I            I agree with the above evaluation. NST completed.    Dr. Josue Barton MD    [facility]     This note was created by [employer] voice recognition. Errors in content may be related to improper recognition by the system; efforts to review and correct have been done but errors may still exist. Please be advised the primary purpose of this note is for me to communicate medical care. Standard sentence structure is not always used. Medical terminology and medical abbreviations may be used. There may be grammatical, typographical, and automated fill ins that may have errors missed in proofreading.

## 2024-09-23 ENCOUNTER — ROUTINE PRENATAL (OUTPATIENT)
Dept: OBGYN CLINIC | Facility: CLINIC | Age: 34
End: 2024-09-23
Payer: COMMERCIAL

## 2024-09-23 VITALS
SYSTOLIC BLOOD PRESSURE: 98 MMHG | HEIGHT: 65 IN | WEIGHT: 145 LBS | DIASTOLIC BLOOD PRESSURE: 64 MMHG | BODY MASS INDEX: 24.16 KG/M2

## 2024-09-23 DIAGNOSIS — Z34.80 SUPERVISION OF OTHER NORMAL PREGNANCY, ANTEPARTUM (HCC): Primary | ICD-10-CM

## 2024-09-23 PROCEDURE — 87081 CULTURE SCREEN ONLY: CPT | Performed by: OBSTETRICS & GYNECOLOGY

## 2024-09-23 PROCEDURE — 87150 DNA/RNA AMPLIFIED PROBE: CPT | Performed by: OBSTETRICS & GYNECOLOGY

## 2024-09-23 NOTE — PROGRESS NOTES
35 y/o  at 36 6/7 W gestation  GBBS today, HIV and trep.   RSV vaccine not in office. Recommend she calls other pharmacies to see if it was available today  Was seen in triage 8 days for vaginal spotting and was evaluated in triage.

## 2024-09-24 LAB — GROUP B STREP BY PCR FOR PCR OVT: NEGATIVE

## 2024-09-25 ENCOUNTER — HOSPITAL ENCOUNTER (OUTPATIENT)
Facility: HOSPITAL | Age: 34
Discharge: HOME | End: 2024-09-26
Attending: OBSTETRICS & GYNECOLOGY | Admitting: OBSTETRICS & GYNECOLOGY
Payer: COMMERCIAL

## 2024-09-25 VITALS — SYSTOLIC BLOOD PRESSURE: 114 MMHG | DIASTOLIC BLOOD PRESSURE: 51 MMHG | HEART RATE: 86 BPM

## 2024-09-26 ENCOUNTER — HOSPITAL ENCOUNTER (INPATIENT)
Facility: HOSPITAL | Age: 34
LOS: 2 days | Discharge: HOME | End: 2024-09-28
Attending: OBSTETRICS & GYNECOLOGY | Admitting: OBSTETRICS & GYNECOLOGY
Payer: COMMERCIAL

## 2024-09-26 ENCOUNTER — ANESTHESIA (OUTPATIENT)
Dept: OBGYN UNIT | Facility: HOSPITAL | Age: 34
End: 2024-09-26
Payer: COMMERCIAL

## 2024-09-26 ENCOUNTER — TELEPHONE (OUTPATIENT)
Dept: OBGYN CLINIC | Facility: CLINIC | Age: 34
End: 2024-09-26

## 2024-09-26 ENCOUNTER — ANESTHESIA EVENT (OUTPATIENT)
Dept: OBGYN UNIT | Facility: HOSPITAL | Age: 34
End: 2024-09-26
Payer: COMMERCIAL

## 2024-09-26 PROBLEM — N89.8 VAGINAL DISCHARGE: Status: ACTIVE | Noted: 2024-09-26

## 2024-09-26 PROBLEM — O47.9 UTERINE CONTRACTIONS (HCC): Status: ACTIVE | Noted: 2024-09-26

## 2024-09-26 PROBLEM — O09.529 ANTEPARTUM MULTIGRAVIDA OF ADVANCED MATERNAL AGE (HCC): Status: ACTIVE | Noted: 2024-09-26

## 2024-09-26 PROBLEM — Z34.90 PREGNANCY (HCC): Status: ACTIVE | Noted: 2024-09-26

## 2024-09-26 LAB
ANTIBODY SCREEN: NEGATIVE
BASOPHILS # BLD AUTO: 0.03 X10(3) UL (ref 0–0.2)
BASOPHILS NFR BLD AUTO: 0.2 %
DEPRECATED RDW RBC AUTO: 36.3 FL (ref 35.1–46.3)
EOSINOPHIL # BLD AUTO: 0.02 X10(3) UL (ref 0–0.7)
EOSINOPHIL NFR BLD AUTO: 0.2 %
ERYTHROCYTE [DISTWIDTH] IN BLOOD BY AUTOMATED COUNT: 11.8 % (ref 11–15)
FETAL SCREEN RESULT: NEGATIVE
HCT VFR BLD AUTO: 33.5 %
HGB BLD-MCNC: 11.2 G/DL
HIV 1+2 AB+HIV1 P24 AG SERPL QL IA: NONREACTIVE
IMM GRANULOCYTES # BLD AUTO: 0.08 X10(3) UL (ref 0–1)
IMM GRANULOCYTES NFR BLD: 0.6 %
LYMPHOCYTES # BLD AUTO: 1.98 X10(3) UL (ref 1–4)
LYMPHOCYTES NFR BLD AUTO: 15.7 %
MCH RBC QN AUTO: 28.4 PG (ref 26–34)
MCHC RBC AUTO-ENTMCNC: 33.4 G/DL (ref 31–37)
MCV RBC AUTO: 85 FL
MONOCYTES # BLD AUTO: 0.87 X10(3) UL (ref 0.1–1)
MONOCYTES NFR BLD AUTO: 6.9 %
NEUTROPHILS # BLD AUTO: 9.67 X10 (3) UL (ref 1.5–7.7)
NEUTROPHILS # BLD AUTO: 9.67 X10(3) UL (ref 1.5–7.7)
NEUTROPHILS NFR BLD AUTO: 76.4 %
PLATELET # BLD AUTO: 258 10(3)UL (ref 150–450)
RBC # BLD AUTO: 3.94 X10(6)UL
RH BLOOD TYPE: NEGATIVE
T PALLIDUM AB SER QL IA: NONREACTIVE
WBC # BLD AUTO: 12.7 X10(3) UL (ref 4–11)

## 2024-09-26 PROCEDURE — 99221 1ST HOSP IP/OBS SF/LOW 40: CPT | Performed by: OBSTETRICS & GYNECOLOGY

## 2024-09-26 PROCEDURE — 3E0334Z INTRODUCTION OF SERUM, TOXOID AND VACCINE INTO PERIPHERAL VEIN, PERCUTANEOUS APPROACH: ICD-10-PCS | Performed by: OBSTETRICS & GYNECOLOGY

## 2024-09-26 PROCEDURE — 59400 OBSTETRICAL CARE: CPT | Performed by: OBSTETRICS & GYNECOLOGY

## 2024-09-26 RX ORDER — IBUPROFEN 600 MG/1
600 TABLET, FILM COATED ORAL EVERY 6 HOURS
Status: DISCONTINUED | OUTPATIENT
Start: 2024-09-26 | End: 2024-09-28

## 2024-09-26 RX ORDER — BUPIVACAINE HYDROCHLORIDE 2.5 MG/ML
20 INJECTION, SOLUTION EPIDURAL; INFILTRATION; INTRACAUDAL ONCE
Status: DISCONTINUED | OUTPATIENT
Start: 2024-09-26 | End: 2024-09-26 | Stop reason: HOSPADM

## 2024-09-26 RX ORDER — SIMETHICONE 80 MG
80 TABLET,CHEWABLE ORAL 3 TIMES DAILY PRN
Status: DISCONTINUED | OUTPATIENT
Start: 2024-09-26 | End: 2024-09-28

## 2024-09-26 RX ORDER — BISACODYL 10 MG
10 SUPPOSITORY, RECTAL RECTAL ONCE AS NEEDED
Status: DISCONTINUED | OUTPATIENT
Start: 2024-09-26 | End: 2024-09-28

## 2024-09-26 RX ORDER — LIDOCAINE HYDROCHLORIDE 10 MG/ML
INJECTION, SOLUTION INFILTRATION; PERINEURAL
Status: COMPLETED | OUTPATIENT
Start: 2024-09-26 | End: 2024-09-26

## 2024-09-26 RX ORDER — ACETAMINOPHEN 500 MG
1000 TABLET ORAL EVERY 6 HOURS PRN
Status: DISCONTINUED | OUTPATIENT
Start: 2024-09-26 | End: 2024-09-26 | Stop reason: HOSPADM

## 2024-09-26 RX ORDER — LIDOCAINE HYDROCHLORIDE 10 MG/ML
30 INJECTION, SOLUTION EPIDURAL; INFILTRATION; INTRACAUDAL; PERINEURAL ONCE
Status: COMPLETED | OUTPATIENT
Start: 2024-09-26 | End: 2024-09-26

## 2024-09-26 RX ORDER — IBUPROFEN 600 MG/1
600 TABLET, FILM COATED ORAL ONCE AS NEEDED
Status: DISCONTINUED | OUTPATIENT
Start: 2024-09-26 | End: 2024-09-26 | Stop reason: HOSPADM

## 2024-09-26 RX ORDER — SODIUM CHLORIDE, SODIUM LACTATE, POTASSIUM CHLORIDE, CALCIUM CHLORIDE 600; 310; 30; 20 MG/100ML; MG/100ML; MG/100ML; MG/100ML
INJECTION, SOLUTION INTRAVENOUS AS NEEDED
Status: DISCONTINUED | OUTPATIENT
Start: 2024-09-26 | End: 2024-09-26 | Stop reason: HOSPADM

## 2024-09-26 RX ORDER — CITRIC ACID/SODIUM CITRATE 334-500MG
30 SOLUTION, ORAL ORAL AS NEEDED
Status: DISCONTINUED | OUTPATIENT
Start: 2024-09-26 | End: 2024-09-26 | Stop reason: HOSPADM

## 2024-09-26 RX ORDER — NALBUPHINE HYDROCHLORIDE 10 MG/ML
2.5 INJECTION, SOLUTION INTRAMUSCULAR; INTRAVENOUS; SUBCUTANEOUS
Status: DISCONTINUED | OUTPATIENT
Start: 2024-09-26 | End: 2024-09-28

## 2024-09-26 RX ORDER — DOCUSATE SODIUM 100 MG/1
100 CAPSULE, LIQUID FILLED ORAL
Status: DISCONTINUED | OUTPATIENT
Start: 2024-09-26 | End: 2024-09-28

## 2024-09-26 RX ORDER — BUPIVACAINE HYDROCHLORIDE 2.5 MG/ML
INJECTION, SOLUTION EPIDURAL; INFILTRATION; INTRACAUDAL
Status: COMPLETED | OUTPATIENT
Start: 2024-09-26 | End: 2024-09-26

## 2024-09-26 RX ORDER — BUPIVACAINE HCL/0.9 % NACL/PF 0.25 %
5 PLASTIC BAG, INJECTION (ML) EPIDURAL AS NEEDED
Status: DISCONTINUED | OUTPATIENT
Start: 2024-09-26 | End: 2024-09-28

## 2024-09-26 RX ORDER — LIDOCAINE HYDROCHLORIDE AND EPINEPHRINE 15; 5 MG/ML; UG/ML
INJECTION, SOLUTION EPIDURAL
Status: COMPLETED | OUTPATIENT
Start: 2024-09-26 | End: 2024-09-26

## 2024-09-26 RX ORDER — AMMONIA INHALANTS 0.04 G/.3ML
0.3 INHALANT RESPIRATORY (INHALATION) AS NEEDED
Status: DISCONTINUED | OUTPATIENT
Start: 2024-09-26 | End: 2024-09-28

## 2024-09-26 RX ORDER — TERBUTALINE SULFATE 1 MG/ML
0.25 INJECTION, SOLUTION SUBCUTANEOUS AS NEEDED
Status: DISCONTINUED | OUTPATIENT
Start: 2024-09-26 | End: 2024-09-26 | Stop reason: HOSPADM

## 2024-09-26 RX ORDER — DEXTROSE, SODIUM CHLORIDE, SODIUM LACTATE, POTASSIUM CHLORIDE, AND CALCIUM CHLORIDE 5; .6; .31; .03; .02 G/100ML; G/100ML; G/100ML; G/100ML; G/100ML
INJECTION, SOLUTION INTRAVENOUS CONTINUOUS
Status: DISCONTINUED | OUTPATIENT
Start: 2024-09-26 | End: 2024-09-26 | Stop reason: HOSPADM

## 2024-09-26 RX ORDER — ACETAMINOPHEN 500 MG
500 TABLET ORAL EVERY 6 HOURS PRN
Status: DISCONTINUED | OUTPATIENT
Start: 2024-09-26 | End: 2024-09-28

## 2024-09-26 RX ORDER — ACETAMINOPHEN 500 MG
500 TABLET ORAL EVERY 6 HOURS PRN
Status: DISCONTINUED | OUTPATIENT
Start: 2024-09-26 | End: 2024-09-26 | Stop reason: HOSPADM

## 2024-09-26 RX ORDER — ACETAMINOPHEN 500 MG
1000 TABLET ORAL EVERY 6 HOURS PRN
Status: DISCONTINUED | OUTPATIENT
Start: 2024-09-26 | End: 2024-09-28

## 2024-09-26 RX ORDER — ONDANSETRON 2 MG/ML
4 INJECTION INTRAMUSCULAR; INTRAVENOUS EVERY 6 HOURS PRN
Status: DISCONTINUED | OUTPATIENT
Start: 2024-09-26 | End: 2024-09-26 | Stop reason: HOSPADM

## 2024-09-26 RX ADMIN — LIDOCAINE HYDROCHLORIDE 3 ML: 10 INJECTION, SOLUTION INFILTRATION; PERINEURAL at 10:26:00

## 2024-09-26 RX ADMIN — LIDOCAINE HYDROCHLORIDE AND EPINEPHRINE 5 ML: 15; 5 INJECTION, SOLUTION EPIDURAL at 10:33:00

## 2024-09-26 RX ADMIN — BUPIVACAINE HYDROCHLORIDE 3 ML: 2.5 INJECTION, SOLUTION EPIDURAL; INFILTRATION; INTRACAUDAL at 10:36:00

## 2024-09-26 NOTE — ANESTHESIA PREPROCEDURE EVALUATION
Anesthesia PreOp Note    HPI:     Laquita Vizcarra is a 34 year old female who presents for preoperative consultation requested by: * No surgeons listed *    Date of Surgery: 9/26/2024    * No procedures listed *  Indication: * No pre-op diagnosis entered *    Relevant Problems   No relevant active problems       NPO:                         History Review:  Patient Active Problem List    Diagnosis Date Noted    Prolonged latent phase of labor ([facility]) 09/26/2024    Uterine contractions ([facility]) 09/26/2024    Vaginal discharge 09/26/2024    Pregnancy ([facility]) 09/26/2024    Vaginal bleeding in pregnancy, third trimester ([facility]) 09/18/2024    Low-risk cell-free DNA, male (2014) 04/24/2024    Supervision of other normal pregnancy, antepartum ([facility]) 06/02/2022    Rh negative state in antepartum period ([facility]) 01/25/2022       No past medical history on file.    Past Surgical History:   Procedure Laterality Date    [place] teeth removed  2012       Facility-Administered Medications Prior to Admission   Medication Dose Route Frequency Provider Last Rate Last Admin    [COMPLETED] Rho D immune globulin (RhoGAM) IM injection 300 mcg  300 mcg Intramuscular Once    300 mcg at 07/24/24 1034     Medications Prior to Admission   Medication Sig Dispense Refill Last Dose    prenatal vitamin with DHA 27-0.8-228 MG Oral Cap Take 1 capsule by mouth daily.        Current Facility-Administered Medications Ordered in Epic   Medication Dose Route Frequency Provider Last Rate Last Admin    dextrose in lactated ringers 5% infusion   Intravenous Continuous Desire Noland MD        lactated ringers infusion   Intravenous PRN Desire Noland MD        lactated ringers IV bolus 500 mL  500 mL Intravenous PRN Desire Noland MD        acetaminophen (Tylenol Extra Strength) tab 500 mg  500 mg Oral Q6H PRN Desire Noland MD        acetaminophen (Tylenol Extra Strength) tab 1,000 mg  1,000 mg Oral Q6H PRN Desire Noland MD        ibuprofen  (Motrin) tab 600 mg  600 mg Oral Once PRN Desire Noland MD        ondansetron (Zofran) 4 MG/2ML injection 4 mg  4 mg Intravenous Q6H PRN Desier Noland MD        oxyTOCIN in sodium chloride 0.9% (Pitocin) 30 Units/500mL infusion premix  62.5-900 sergye-units/min Intravenous Continuous Desire Noland MD        terbutaline (Brethine) 1 MG/ML injection 0.25 mg  0.25 mg Subcutaneous PRN Desire Noland MD        sodium citrate-citric acid (Bicitra) 500-334 MG/5ML oral solution 30 mL  30 mL Oral PRN Desire Noland MD        lidocaine PF (Xylocaine-MPF) 1% injection  30 mL Intradermal Once Desire Noland MD        fentaNYL (Sublimaze) 50 mcg/mL injection 50 mcg  50 mcg Intravenous Q30 Min PRN Desire Noland MD        lactated ringers IV bolus 1,000 mL  1,000 mL Intravenous Once Desire Noland MD        fentaNYL-bupivacaine 2 mcg/mL-0.125% in sodium chloride 0.9% 100 mL EPIDURAL infusion premix   Epidural Continuous Desire Noland MD        fentaNYL (Sublimaze) 50 mcg/mL injection 100 mcg  100 mcg Epidural Once Desire Noland MD        bupivacaine PF (Marcaine) 0.25% injection  20 mL Epidural Once Desire Noland MD        EPHEDrine (PF) 25 MG/5 ML injection 5 mg  5 mg Intravenous PRN Desire Noland MD        nalbuphine (Nubain) 10 mg/mL injection 2.5 mg  2.5 mg Intravenous Q15 Min PRN Desire Noland MD         No current [facility]-ordered outpatient medications on file.       No Known Allergies    Family History   Problem Relation Age of Onset    Lipids Father     Heart Disorder Father     Colon Cancer Mother     Kidney Disease Mother         IgA disorder    No Known Problems Son     No Known Problems Son     Stroke Maternal Grandmother     No Known Problems Maternal Grandfather     No Known Problems Paternal Grandmother     No Known Problems Paternal Grandfather     No Known Problems Brother      Social History     Socioeconomic History    Marital status: Single    Tobacco Use    Smoking status: Never    Smokeless tobacco: Never   Vaping Use    Vaping status: Never Used   Substance and Sexual Activity    Alcohol use: Not Currently     Comment: socially. Stopped when found out was pregnant     Drug use: Never    Sexual activity: Yes   Other Topics Concern    Blood Transfusions No       Available pre-op labs reviewed.  Lab Results   Component Value Date    WBC 12.7 (H) 09/26/2024    RBC 3.94 09/26/2024    HGB 11.2 (L) 09/26/2024    HCT 33.5 (L) 09/26/2024    MCV 85.0 09/26/2024    MCH 28.4 09/26/2024    MCHC 33.4 09/26/2024    RDW 11.8 09/26/2024    .0 09/26/2024             Vital Signs:  There is no height or weight on file to calculate BMI.   vitals were not taken for this visit.   There were no vitals filed for this visit.     Anesthesia Evaluation     Patient summary reviewed and Nursing notes reviewed    No history of anesthetic complications   Airway   Mallampati: II  TM distance: <3 FB  Neck ROM: full  Dental - Dentition appears grossly intact     Pulmonary - negative ROS and normal exam   Cardiovascular - negative ROS and normal exam  Exercise tolerance: good    Neuro/Psych - negative ROS     GI/Hepatic/Renal - negative ROS     Endo/Other - negative ROS   Abdominal                  Anesthesia Plan:   ASA:  2  Emergent    Plan:   Epidural  Post-op Pain Management: Continuous epidural  Plan Comments: Neuraxial anesthesia was explained in detail to the patient, addressing the technique, intended outcome and known adverse events namely spinal or epidural bleeding, infection, post dural puncture headaches, complete spinal block with resulting cardiovascular and respiratory failure, block failure and or need for multiple attempts or switching to general anesthesia.   Informed Consent Plan and Risks Discussed With:  Patient  Discussed plan with:  Attending      I have informed Laquita Vizcarra and/or legal guardian or family member of the nature of the anesthetic plan,  benefits, risks including possible dental damage if relevant, major complications, and any alternative forms of anesthetic management.   All of the patient's questions were answered to the best of my ability. The patient desires the anesthetic management as planned.  Rico Alarcon MD  9/26/2024 10:16 AM  Present on Admission:  **None**

## 2024-09-26 NOTE — L&D DELIVERY NOTE
Coy Vizcarra [[id number]]      Labor Events     labor?: No   steroids?: None  Antibiotics received during labor?: No  Rupture date/time: 2024 1030     Rupture type: SROM  Fluid color: Clear  Labor type: Spontaneous Onset of Labor  Intrapartum & labor complications: Variable decelerations       Labor Event Times    Dilation complete date/time: 2024 1134       [place] Presentation    Presentation: Vertex       Operative Delivery    No data filed       Shoulder Dystocia    No data filed       Anesthesia    Method: Epidural               Delivery      Head delivery date/time: 2024 12:20:27   Delivery date/time:  24 12:20:39   Delivery type: Normal spontaneous vaginal delivery    Details:     Delivery location: delivery room  Delivery Room Temperature: 72       Delivery Providers    Delivering Clinician: Desire Noland MD   Delivery personnel:  Provider Role   Zohreh Mcmillan, RN Baby Nurse   Carlota Antunez RN Delivery Nurse             Cord    Vessels: 3 Vessels  Complications: None  Timed cord clamping: No  Cord blood disposition: to lab  Gases sent?: No       [place] Measurements    No data filed       Placenta    No data filed       Apgars    No data filed       Skin to Skin    No data filed       Vaginal Count    Initial count RN: Carlota Antunez RN  Initial count Tech: Kendra Cole    Initial counts 10   0    Final counts        Final count RN: Carlota Antunez RN  Final count MD: Desire Noland MD       Lacerations    No data filed              [facility]   Vaginal Delivery Procedure Note      Laquita Vizcarra Patient Status:  Inpatient    1990 MRN [id number]   Location [facility] Attending Desire Noland MD   Hosp Day # 0 PCP Teofilo Sanz MD     Date of Delivery: 24    Pre procedure diagnosis:  IUP at Term    Post procedure diagnosis: Same, delivered,      Procedure: Normal Spontaneous Vaginal Delivery    Attending: Desire Noland MD      Anesthesia: Epidural  EBL:  150 cc    Indications:  Patient is a 34 year old  at 37w2d who presented in active labor, she progressed to complete cervical dilation spontaneously      Findings:    Sex: male     Weight:pending      Apgars: 9/9      Lacerations: none    Procedure:  The patient was confirmed to be completely dilated. The patients was placed in the dorsolithotomy position.  She was then encouraged to push.  As the head was delivered in LONG position, the perineum was supported to decrease the risk of tearing. The shoulders rotated spontaneously and delivery was completed without complication.  The cord was doubly clamped then cut after 60 seconds.  The baby was placed on the mother's abdomen at her request.  IV pitocin bolus was initiated.  The cord blood was sampled. The placenta then delivered intact. The uterus was noted to be firm. Good hemostasis was noted. The perineum, vaginal mucosa and cervix was then examined. No lacerations noted\    Bleeding was minimal.  The patient was then moved to the supine position in stable condition.  Sponge, needle, and instrument counts were correct.    Complications:  None     Mother and infant in good condition in the delivery room.    MD ASHLEY Zuleta

## 2024-09-26 NOTE — PROGRESS NOTES
Pt is a 34 year old female admitted to TR2/TR2-A.     Chief Complaint   Patient presents with    R/o Labor     Contractions are getting more painful. Denies LOF. +FM      Pt is  37w2d intra-uterine pregnancy.  History obtained, consents signed. Oriented to room, staff, and plan of care.

## 2024-09-26 NOTE — TRIAGE
[facility]  part of [facility]      Triage Note    Laquita Vizcarra Patient Status:  Outpatient    1990 MRN [id number]   Location [facility] Attending Josue Tovar MD   Hosp Day # 0 PCP Teofilo Sanz MD      Para:   Estimated Date of Delivery: 10/15/24  Gestation: 37w2d    Chief Complaint    R/o Labor; R/o Rom         Allergies:  No Known Allergies    No orders of the defined types were placed in this encounter.      Lab Results   Component Value Date    WBC 10.9 2024    HGB 12.4 2024    HCT 36.5 2024    .0 2024    CREATSERUM 0.66 10/23/2023    BUN 6 (L) 10/23/2023     10/23/2023    K 4.1 10/23/2023     10/23/2023    CO2 25.0 10/23/2023    GLU 94 10/23/2023    CA 9.1 10/23/2023    ALB 3.7 10/23/2023    ALKPHO 67 10/23/2023    BILT 0.5 10/23/2023    TP 7.2 10/23/2023    AST 18 10/23/2023    ALT 20 10/23/2023    TSH 0.854 10/23/2023       Clinitek UA  Lab Results   Component Value Date    SPECGRAVITY 1.020 2022    URINECUL No Growth at 18-24 hrs. 2024       UA  Lab Results   Component Value Date    SPECGRAVITY 1.020 2022    NITRITE Negative 2022       Vitals:    24 2330 24 2340   BP: 114/51 114/51   Pulse: 86 86       NST  Variability: Moderate           Accelerations: Yes           Decelerations: Variable            Baseline: 150 BPM           Uterine Irritability: No           Contractions: Regular                                        Acoustic Stimulator: No           Nonstress Test Interpretation: Reactive                                    Additional Comments       Chief Complaint   Patient presents with    R/o Labor    R/o Rom   'S NST reactive. One variable present. Tracing revived by dr Tovar. Speculum done. No pooling, no ferning. Copious amount of whitish discharge. Pt ambulate and rechecked. Cx 3,-2. Dr tovar at  for pt discharge. Instructions on  labor and kick counts given to pt. Pt and SO verbalize understanding.      Nasra KATE RN  9/26/2024 2:15 AM

## 2024-09-26 NOTE — PROGRESS NOTES
Pt is a 34 year old female admitted to LDR2/LDR2-A.     Chief Complaint   Patient presents with    R/o Labor     Contractions are getting more painful. Denies LOF. +FM      Pt is  37w2d intra-uterine pregnancy.  History obtained, consents signed. Oriented to room, staff, and plan of care.

## 2024-09-26 NOTE — H&P
[facility]  Obstetrics and Gynecology  History & Physical    Laquita Vizcarra Patient Status:  Inpatient    1990 MRN [id number]   Location [facility] Attending Desire Noland MD   Hospital Day 0 PCP Teofilo Sanz MD     CC: Patient is here for labor    SUBJECTIVE:    Laquita Vizcarra is a 34 year old  female at 37w2d Estimated Date of Delivery: 10/15/24 who is being admitted for labor management. Her current obstetrical history is significant for AMA. Patient reports contractions since last PM.     {positive UCx.    negative VB.   negative LOF.    positive Fetal movement.   negative Nausea, Vomiting, headache, vision changes and RUQ/Epigastric pain.       MALKA Confirmation  LMP: Patient's last menstrual period was 2024.  MALKA: 10/15/2024, by Last Menstrual Period     OB Ultrasounds:   1) OB US 24  Single IUP in cephalic presentation.    Placenta is anterior.   A 3 vessel cord is noted.  Cardiac activity is present at 156 bpm  EFW 1986 g ( 4 lb 6 oz); 52%.    PEMA is  9.1 cm.  MVP is 4.7 cm    Obstetric History:   OB History    Para Term  AB Living   3 2 2     2   SAB IAB Ectopic Multiple Live Births         0 2      # Outcome Date GA Lbr Kirill/2nd Weight Sex Type Anes PTL Lv   3 Current            2 Term 22 39w4d 13:49 / 00:11 6 lb 5.6 oz (2.88 kg) M NORMAL SPONT EPI N BUD   1 Term 08 40w0d  8 lb 3 oz (3.714 kg) M NORMAL SPONT None N BUD     Past Medical History: History reviewed. No pertinent past medical history.  Past Social History:   Past Surgical History:   Procedure Laterality Date    [place] teeth removed       Family History:   Family History   Problem Relation Age of Onset    Lipids Father     Heart Disorder Father     Colon Cancer Mother     Kidney Disease Mother         IgA disorder    No Known Problems Son     No Known Problems Son     Stroke Maternal Grandmother     No Known Problems Maternal Grandfather      No Known Problems Paternal Grandmother     No Known Problems Paternal Grandfather     No Known Problems Brother      Social History:   Social History     Tobacco Use    Smoking status: Never    Smokeless tobacco: Never   Substance Use Topics    Alcohol use: Not Currently     Comment: socially. Stopped when found out was pregnant        Home Meds:   Facility-Administered Medications Prior to Admission   Medication Dose Route Frequency Provider Last Rate Last Admin    [COMPLETED] Rho D immune globulin (RhoGAM) IM injection 300 mcg  300 mcg Intramuscular Once    300 mcg at 24 1034     Medications Prior to Admission   Medication Sig Dispense Refill Last Dose    prenatal vitamin with DHA 27-0.8-228 MG Oral Cap Take 1 capsule by mouth daily.        Allergies: No Known Allergies    OBJECTIVE:    Pulse:  [80-95] 95  Resp:  [16] 16  BP: (103-129)/(50-72) 121/58  SpO2:  [98 %-100 %] 100 %  Body mass index is 24.13 kg/m².    General: AAO. NAD.   Lungs: Respirations non labored   CV: peripheral pulses +2 bilaterally   Abdomen: FHT present, gravid   Extremities: negative edema bilaterally, negative calf tenderness bilaterally    FHT: moderate variability/130 BPM / Positive accelerations/Negative decelerations   TOCO: q 4-5 minutes    SVE: 7/10/-3     Prenatal Labs Brief Review   Blood Type:   Lab Results   Component Value Date    ABO B 2024    RH Negative 2024     GBS:  Negative      Inpatient labs:  Lab Results   Component Value Date    WBC 12.7 2024    HGB 11.2 2024    HCT 33.5 2024    .0 2024       ASSESSMENT/ PLAN:    Laquita Vizcarra is a 34 year old  female at 37w2d Estimated Date of Delivery: 10/15/24 who is being admitted for labor management.    Patient Active Problem List   Diagnosis    Rh negative state in antepartum period ([facility])    Supervision of other normal pregnancy, antepartum ([facility])    Low-risk cell-free DNA, male ()    Vaginal bleeding in pregnancy, third  trimester ([facility])    Prolonged latent phase of labor ([facility])    Uterine contractions ([facility])    Vaginal discharge    Pregnancy ([facility])     (normal spontaneous vaginal delivery) ([facility])    Antepartum multigravida of advanced maternal age ([facility])       1. Labor:   2. Fetal monitoring: CEFM  3. GBS: negative   4. Pain: OK for epidural     Risks, benefits, alternatives and possible complications have been discussed in detail with the patient.  Pre-admission, admission, and post admission procedures and expectations were discussed in detail.  All questions answered, all appropriate consents signed at the Hospital. Admission is planned for today.     MD ASHLEY Zuleta OBGYN

## 2024-09-26 NOTE — DISCHARGE INSTRUCTIONS
Post Vaginal Delivery Home Care Instructions     We hope you were pleased with your care at [facility].  We wish you the best outcome and overall experience with the delivery of your baby.  These instructions will help to minimize pain, limit the risk for an infection, and improve the likelihood of a successful recovery.    What to Expect:  Abdominal cramping after delivery especially if you are breastfeeding.   Vaginal bleeding for about 4-6 weeks that may be followed by a yellow or white discharge for a few more weeks.  Your period will resume in approximately 6-8 weeks, unless you are breastfeeding.    If you are bottle feeding, you may notice breast engorgement in about 3 days.  Your breast may be sore and hard. Please wear a tight fitted bra or sports bra for 24-36 hours to help prevent your breast from producing milk, and use ice packs to relive any discomfort.  If you are breastfeeding, nipple dryness is very common the first few days.    Constipation is common after having a baby.  Please increase fluid and fiber in your diet.      Over-The-Counter Medication  Non-prescription anti-inflammatory medications can also help to ease the pain.  You may take Aleve, Tylenol or Ibuprofen   Colace or Metamucil for Constipation  Lanolin for dry nipples  Tucks, Witch Hazel and Epifoam for vaginal/perineum discomfort.   Drink a full glass of water with oral medication and take as directed.    Wound Care  The following instructions will promote proper healing and help to prevent infection  Vaginal/perineum Care: Sitz Bath for 15mins, 2-3 times a day,    Bathing/Showers  You may resume showers  No baths, swimming, hot tubs until your post-partum visit    Home Medication  Resume your home medications as instructed    Diet  Resume your normal diet    Activity  Refrain from vaginal intercourse, vaginal suppositories, tampon use or douches until after your post-partum visit.  No exercising for 4 weeks  You may  climb stairs minimally for the 1st week.    Do not do heavy housework for at least 2-3 weeks    Return to Work or School  You may return to work in approximately 6 weeks  Contact your obstetrician’s office, if you need a medical release. ([phone])    Driving  Avoid driving if you are taking narcotics for pain relief.    Follow-up Appointment with Your Obstetrician  Call your obstetrician’s office today for an appointment in 4-6 weeks.    The number is [phone].  Verify your appointment date, day, time, and location.  At your 1st post-partum office visit:  Your progress will be evaluated, findings reviewed, and any additional concerns and instructions will be discussed.    Questions or Concerns  Call your obstetrician’s office if you experience the following:  Severe pain not controlled by pain medication  Foul smelling vaginal discharge  Heavy bleeding  Shortness of breath  Fever  Redness, increased swelling or drainage from your incision  Crying and periods of sadness that prevents you from caring for yourself and your baby  Burning sensation during urination or inability to urinate  Swelling, redness or abnormal warmth to your leg/calf  Please call ([phone]). If your call is made after office hours, a physician will be available to help you.  There is always a provider covering our patients.    Thank you for coming to [facility] to start your new family.  The nurses and the anesthesiologists try very hard to make sure you receive the best care possible.  Your trust in them as well as us is greatly appreciated.    Thanks so much,   The [facility]

## 2024-09-26 NOTE — PROGRESS NOTES
Pt is a 34 year old female admitted to TR1/TR1-A.     Chief Complaint   Patient presents with    R/o Labor    R/o Rom      Pt is  37w1d intra-uterine pregnancy.  History obtained, consents signed. Oriented to room, staff, and plan of care.

## 2024-09-26 NOTE — PROGRESS NOTES
Patient transferred to LDR 2 by cart with belongings, accompanied by significant other. Bedside report given to Carlota GUZMAN.

## 2024-09-26 NOTE — TELEPHONE ENCOUNTER
RN spoke with pt. Pt was in triage last night for contractions and was sent home. Pt says the contractions are q5min, lasting 60 seconds, and have intensified and are more painful. RN can hear pt crying through a contraction while on the phone. Pt reports +FM and denies LOF, VB. RN told pt to proceed to [facility] triage for evaluation. Directions offered. Pt verbalized understanding and agreed with plan of care.      Report given to triage THOMAS Macias.  On-call provider, Dr. Noland, notified.

## 2024-09-26 NOTE — PROGRESS NOTES
[facility]  Obstetrics and Gynecology    OB/GYN: Triage Progress Note       Subjective:     Laquita Vizcarra is a 34 year old  female at 37w2d Estimated Date of Delivery: 10/15/24 who is being seen in triage with complaint of LOF. The patient reports LOF began on 24 at 2030. Denies any bleeding, and noted good fetal movement. Initially denies contractions. On monitor in OB triage noted contractions about every 5 min. SVE was 2 cm then 3 cm after a hour. Discussed options of morphine sleep therapy versus home with labor precautions. Patient elected for discharge home.       Objective:      Vitals:    24 2330 24 2340   BP: 114/51 114/51   Pulse: 86 86         General:   alert, appears stated age, and cooperative   Abdomen:  normal findings: soft, non-tender, gravid   FHT: moderate variability / 140 BPM / + accel / no decel. Had one variable early on in monitoring without repeating.   TOCO q 5 min   SVE:  Per RN    Dilation: 3cm    Effacement: 80    Station:  -2     SSE: no pooling, negative Nitrazine and negative Ferning per RN.        Assessment:     Laquita Vizcarra is a 34 year old  female at 37w2d who is being seen in triage for leakage of fluid and contractions.          Plan:     1) LOF   - Ruled out for rupture.   - Has significant yeast infection. Recommend taking diflucan today with repeat dose in 72 hours.   - Labor precautions reviewed.     2) Contractions/latent labor   - initially 2 cm.   - Made some small change to 3 cm.   - Offered morphine sleep therapy versus home with labor precautions. Patient elected for discharge home.   - NST reactive and reassuring     - discharge patient to home in stable condition   - advised to follow up with OBGYN in 1 week   - Labor precautions and kick counts reviewed.      Dr. Josue Barton MD    [facility]     This note was created by [employer] voice recognition. Errors in content may be related to improper recognition by the  system; efforts to review and correct have been done but errors may still exist. Please be advised the primary purpose of this note is for me to communicate medical care. Standard sentence structure is not always used. Medical terminology and medical abbreviations may be used. There may be grammatical, typographical, and automated fill ins that may have errors missed in proofreading.

## 2024-09-26 NOTE — ANESTHESIA POSTPROCEDURE EVALUATION
Patient: Laquita Vizcarra    Procedure Summary       Date: 09/26/24 Room / Location:     Anesthesia Start: 1026 Anesthesia Stop: 1224    Procedure: LABOR ANALGESIA Diagnosis:     Scheduled Providers:  Anesthesiologist: Rico Alarcon MD    Anesthesia Type: epidural ASA Status: 2 - Emergent            Anesthesia Type: epidural    Vitals Value Taken Time   /56 09/26/24 1316   Temp  09/26/24 1327   Pulse 79 09/26/24 1316   Resp  09/26/24 1327   SpO2 100 % 09/26/24 1231       [facility] AN Post Evaluation:   Patient Evaluated in floor  Patient Participation: complete - patient participated  Level of Consciousness: awake and alert  Pain Score: 0  Pain Management: adequate  Airway Patency:patent  Yes    Nausea/Vomiting: none  Cardiovascular Status: acceptable  Respiratory Status: acceptable  Postoperative Hydration acceptable      Rico Alarcon MD  9/26/2024 1:27 PM

## 2024-09-26 NOTE — ANESTHESIA PROCEDURE NOTES
Labor Analgesia    Date/Time: 9/26/2024 10:26 AM    Performed by: Rico Alarcon MD  Authorized by: Rico Alarcon MD      General Information and Staff    Start Time:  9/26/2024 10:26 AM  End Time:  9/26/2024 10:32 AM  Anesthesiologist:  Rico Alarcon MD  Performed by:  Anesthesiologist  Patient Location:  OB  Reason for Block: labor epidural    Preanesthetic Checklist: patient identified, IV checked, site marked, risks and benefits discussed, monitors and equipment checked, pre-op evaluation, timeout performed, anesthesia consent and sterile technique used      Procedure Details    Patient Position:  Sitting  Prep: ChloraPrep    Monitoring:  Heart rate  Approach:  Midline    Epidural Needle    Injection Technique:  FELIX air  Needle Type:  Tuohy  Needle Gauge:  18 G  Needle Length:  3.5 in  Needle Insertion Depth:  5  Location:  L3-4    Spinal Needle    Needle Type:  Pencil-tip  Needle Gauge:  27 G    Catheter    Catheter Type:  Multi-orifice  Catheter Size:  20 G  Catheter at Skin Depth:  10  Test Dose:  Negative    Assessment      Additional Comments

## 2024-09-26 NOTE — PLAN OF CARE
Problem: Patient Centered Care  Goal: Patient preferences are identified and integrated in the patient's plan of care  Description: Interventions:  - What would you like us to know as we care for you? 3rd baby  - Provide timely, complete, and accurate information to patient/family  - Incorporate patient and family knowledge, values, beliefs, and cultural backgrounds into the planning and delivery of care  - Encourage patient/family to participate in care and decision-making at the level they choose  - Honor patient and family perspectives and choices  9/26/2024 1603 by Beata Trejo RN  Outcome: Progressing  9/26/2024 1602 by Beata Trejo RN  Outcome: Progressing     Problem: Patient/Family Goals  Goal: Patient/Family Long Term Goal  Description: Patient's Long Term Goal: Patient states she would like to have an uncomplicated delivery.     Interventions:  - Include patient in plan of care  - Timely nursing assessment and education  - See additional Care Plan goals for specific interventions  9/26/2024 1603 by Beata Trejo RN  Outcome: Progressing  9/26/2024 1602 by Beata Trejo RN  Outcome: Progressing  Goal: Patient/Family Short Term Goal  Description: Patient's Short Term Goal: Patient states she would like to delivery with comfort and pain control.    Interventions:   - Timely nursing education and assessment.  - Include patient in plan of care  - See additional Care Plan goals for specific interventions  9/26/2024 1603 by Beata Trejo RN  Outcome: Progressing  9/26/2024 1602 by Beata Trejo RN  Outcome: Progressing     Problem: POSTPARTUM  Goal: Long Term Goal:Experiences normal postpartum course  Description: INTERVENTIONS:  - Assess and monitor vital signs and lab values.  - Assess fundus and lochia.  - Provide ice/sitz baths for perineum discomfort.  - Monitor healing of incision/episiotomy/laceration, and assess for signs and symptoms of infection and hematoma.  - Assess  bladder function and monitor for bladder distention.  - Provide/instruct/assist with pericare as needed.  - Provide VTE prophylaxis as needed.  - Monitor bowel function.  - Encourage ambulation and provide assistance as needed.  - Assess and monitor emotional status and provide social service/psych resources as needed.  - Utilize standard precautions and use personal protective equipment as indicated. Ensure aseptic care of all intravenous lines and invasive tubes/drains.  - Obtain immunization and exposure to communicable diseases history.  Outcome: Progressing  Goal: Optimize infant feeding at the breast  Description: INTERVENTIONS:  - Initiate breast feeding within first hour after birth.   - Monitor effectiveness of current breast feeding efforts.  - Assess support systems available to mother/family.  - Identify cultural beliefs/practices regarding lactation, letdown techniques, maternal food preferences.  - Assess mother's knowledge and previous experience with breast feeding.  - Provide information as needed about early infant feeding cues (e.g., rooting, lip smacking, sucking fingers/hand) versus late cue of crying.  - Discuss/demonstrate breast feeding aids (e.g., infant sling, nursing footstool/pillows, and breast pumps).  - Encourage mother/other family members to express feelings/concerns, and actively listen.  - Educate father/SO about benefits of breast feeding and how to manage common lactation challenges.  - Recommend avoidance of specific medications or substances incompatible with breast feeding.  - Assess and monitor for signs of nipple pain/trauma.  - Instruct and provide assistance with proper latch.  - Review techniques for milk expression (breast pumping) and storage of breast milk. Provide pumping equipment/supplies, instructions and assistance, as needed.  - Encourage rooming-in and breast feeding on demand.  - Encourage skin-to-skin contact.  - Provide LC support as needed.  - Assess for and  manage engorgement.  - Provide breast feeding education handouts and information on community breast feeding support.   Outcome: Progressing  Goal: Establishment of adequate milk supply with medication/procedure interruptions  Description: INTERVENTIONS:  - Review techniques for milk expression (breast pumping).   - Provide pumping equipment/supplies, instructions, and assistance until it is safe to breastfeed infant.  Outcome: Progressing  Goal: Experiences normal breast weaning course  Description: INTERVENTIONS:  - Assess for and manage engorgement.  - Instruct on breast care.  - Provide comfort measures.  Outcome: Progressing  Goal: Appropriate maternal -  bonding  Description: INTERVENTIONS:  - Assess caregiver- interactions.  - Assess caregiver's emotional status and coping mechanisms.  - Encourage caregiver to participate in  daily care.  - Assess support systems available to mother/family.  - Provide /case management support as needed.  Outcome: Progressing     Problem: BIRTH - VAGINAL/ SECTION  Goal: Fetal and maternal status remain reassuring during the birth process  Description: INTERVENTIONS:  - Monitor vital signs  - Monitor fetal heart rate  - Monitor uterine activity  - Monitor labor progression (vaginal delivery)  - DVT prophylaxis (C/S delivery)  - Surgical antibiotic prophylaxis (C/S delivery)  2024 1603 by Beata Trejo, RN  Outcome: Completed  2024 1602 by Beata Trejo, RN  Outcome: Progressing     Problem: PAIN - ADULT  Goal: Verbalizes/displays adequate comfort level or patient's stated pain goal  Description: INTERVENTIONS:  - Encourage pt to monitor pain and request assistance  - Assess pain using appropriate pain scale  - Administer analgesics based on type and severity of pain and evaluate response  - Implement non-pharmacological measures as appropriate and evaluate response  - Consider cultural and social influences on pain and  pain management  - Manage/alleviate anxiety  - Utilize distraction and/or relaxation techniques  - Monitor for opioid side effects  - Notify MD/LIP if interventions unsuccessful or patient reports new pain  - Anticipate increased pain with activity and pre-medicate as appropriate  9/26/2024 1603 by Beata Trejo, RN  Outcome: Completed  9/26/2024 1602 by Beata Trejo, RN  Outcome: Progressing     Problem: ANXIETY  Goal: Will report anxiety at manageable levels  Description: INTERVENTIONS:  - Administer medication as ordered  - Teach and rehearse alternative coping skills  - Provide emotional support with 1:1 interaction with staff  9/26/2024 1603 by Beata Trejo, RN  Outcome: Completed  9/26/2024 1602 by Beata Trejo, RN  Outcome: Progressing   Received patient into room 353. Bedside shift report received from Sangeeta GUZMAN .Patient transferred to bed from .  Bed in locked and low position.  Side rails up X2.  Baby present in open crib.  ID bands verified.Patient and family oriented to unit, room and call light within reach.  Reinforced to patient to call for assistance.  Plan of care reviewed.  Will continue to monitor per protocol.

## 2024-09-26 NOTE — PLAN OF CARE
Problem: BIRTH - VAGINAL/ SECTION  Goal: Fetal and maternal status remain reassuring during the birth process  Description: INTERVENTIONS:  - Monitor vital signs  - Monitor fetal heart rate  - Monitor uterine activity  - Monitor labor progression (vaginal delivery)  - DVT prophylaxis (C/S delivery)  - Surgical antibiotic prophylaxis (C/S delivery)  Outcome: Progressing     Problem: PAIN - ADULT  Goal: Verbalizes/displays adequate comfort level or patient's stated pain goal  Description: INTERVENTIONS:  - Encourage pt to monitor pain and request assistance  - Assess pain using appropriate pain scale  - Administer analgesics based on type and severity of pain and evaluate response  - Implement non-pharmacological measures as appropriate and evaluate response  - Consider cultural and social influences on pain and pain management  - Manage/alleviate anxiety  - Utilize distraction and/or relaxation techniques  - Monitor for opioid side effects  - Notify MD/LIP if interventions unsuccessful or patient reports new pain  - Anticipate increased pain with activity and pre-medicate as appropriate  Outcome: Progressing     Problem: ANXIETY  Goal: Will report anxiety at manageable levels  Description: INTERVENTIONS:  - Administer medication as ordered  - Teach and rehearse alternative coping skills  - Provide emotional support with 1:1 interaction with staff  Outcome: Progressing     Problem: Patient Centered Care  Goal: Patient preferences are identified and integrated in the patient's plan of care  Description: Interventions:  - What would you like us to know as we care for you?   - Provide timely, complete, and accurate information to patient/family  - Incorporate patient and family knowledge, values, beliefs, and cultural backgrounds into the planning and delivery of care  - Encourage patient/family to participate in care and decision-making at the level they choose  - Honor patient and family perspectives and  choices  Outcome: Progressing     Problem: Patient/Family Goals  Goal: Patient/Family Long Term Goal  Description: Patient's Long Term Goal: Patient states she would like to have an uncomplicated delivery.     Interventions:  - Include patient in plan of care  - Timely nursing assessment and education  - See additional Care Plan goals for specific interventions  Outcome: Progressing  Goal: Patient/Family Short Term Goal  Description: Patient's Short Term Goal: Patient states she would like to delivery with comfort and pain control.    Interventions:   - Timely nursing education and assessment.  - Include patient in plan of care  - See additional Care Plan goals for specific interventions  Outcome: Progressing

## 2024-09-27 LAB
BASOPHILS # BLD AUTO: 0.04 X10(3) UL (ref 0–0.2)
BASOPHILS NFR BLD AUTO: 0.4 %
DEPRECATED RDW RBC AUTO: 37 FL (ref 35.1–46.3)
EOSINOPHIL # BLD AUTO: 0.08 X10(3) UL (ref 0–0.7)
EOSINOPHIL NFR BLD AUTO: 0.7 %
ERYTHROCYTE [DISTWIDTH] IN BLOOD BY AUTOMATED COUNT: 11.8 % (ref 11–15)
HCT VFR BLD AUTO: 30.8 %
HGB BLD-MCNC: 10.1 G/DL
IMM GRANULOCYTES # BLD AUTO: 0.08 X10(3) UL (ref 0–1)
IMM GRANULOCYTES NFR BLD: 0.7 %
LYMPHOCYTES # BLD AUTO: 2.61 X10(3) UL (ref 1–4)
LYMPHOCYTES NFR BLD AUTO: 23 %
MCH RBC QN AUTO: 28.2 PG (ref 26–34)
MCHC RBC AUTO-ENTMCNC: 32.8 G/DL (ref 31–37)
MCV RBC AUTO: 86 FL
MONOCYTES # BLD AUTO: 0.83 X10(3) UL (ref 0.1–1)
MONOCYTES NFR BLD AUTO: 7.3 %
NEUTROPHILS # BLD AUTO: 7.72 X10 (3) UL (ref 1.5–7.7)
NEUTROPHILS # BLD AUTO: 7.72 X10(3) UL (ref 1.5–7.7)
NEUTROPHILS NFR BLD AUTO: 67.9 %
PLATELET # BLD AUTO: 230 10(3)UL (ref 150–450)
RBC # BLD AUTO: 3.58 X10(6)UL
WBC # BLD AUTO: 11.4 X10(3) UL (ref 4–11)

## 2024-09-27 NOTE — PLAN OF CARE
Problem: Patient Centered Care  Goal: Patient preferences are identified and integrated in the patient's plan of care  Description: Interventions:  - What would you like us to know as we care for you?   - Provide timely, complete, and accurate information to patient/family  - Incorporate patient and family knowledge, values, beliefs, and cultural backgrounds into the planning and delivery of care  - Encourage patient/family to participate in care and decision-making at the level they choose  - Honor patient and family perspectives and choices  Outcome: Progressing     Problem: Patient/Family Goals  Goal: Patient/Family Long Term Goal  Description: Patient's Long Term Goal: Patient states she would like to have an uncomplicated delivery.     Interventions:  - Include patient in plan of care  - Timely nursing assessment and education  - See additional Care Plan goals for specific interventions  Outcome: Progressing  Goal: Patient/Family Short Term Goal  Description: Patient's Short Term Goal: Patient states she would like to delivery with comfort and pain control.    Interventions:   - Timely nursing education and assessment.  - Include patient in plan of care  - See additional Care Plan goals for specific interventions  Outcome: Progressing     Problem: POSTPARTUM  Goal: Long Term Goal:Experiences normal postpartum course  Description: INTERVENTIONS:  - Assess and monitor vital signs and lab values.  - Assess fundus and lochia.  - Provide ice/sitz baths for perineum discomfort.  - Monitor healing of incision/episiotomy/laceration, and assess for signs and symptoms of infection and hematoma.  - Assess bladder function and monitor for bladder distention.  - Provide/instruct/assist with pericare as needed.  - Provide VTE prophylaxis as needed.  - Monitor bowel function.  - Encourage ambulation and provide assistance as needed.  - Assess and monitor emotional status and provide social service/psych resources as  needed.  - Utilize standard precautions and use personal protective equipment as indicated. Ensure aseptic care of all intravenous lines and invasive tubes/drains.  - Obtain immunization and exposure to communicable diseases history.  Outcome: Progressing  Goal: Optimize infant feeding at the breast  Description: INTERVENTIONS:  - Initiate breast feeding within first hour after birth.   - Monitor effectiveness of current breast feeding efforts.  - Assess support systems available to mother/family.  - Identify cultural beliefs/practices regarding lactation, letdown techniques, maternal food preferences.  - Assess mother's knowledge and previous experience with breast feeding.  - Provide information as needed about early infant feeding cues (e.g., rooting, lip smacking, sucking fingers/hand) versus late cue of crying.  - Discuss/demonstrate breast feeding aids (e.g., infant sling, nursing footstool/pillows, and breast pumps).  - Encourage mother/other family members to express feelings/concerns, and actively listen.  - Educate father/SO about benefits of breast feeding and how to manage common lactation challenges.  - Recommend avoidance of specific medications or substances incompatible with breast feeding.  - Assess and monitor for signs of nipple pain/trauma.  - Instruct and provide assistance with proper latch.  - Review techniques for milk expression (breast pumping) and storage of breast milk. Provide pumping equipment/supplies, instructions and assistance, as needed.  - Encourage rooming-in and breast feeding on demand.  - Encourage skin-to-skin contact.  - Provide LC support as needed.  - Assess for and manage engorgement.  - Provide breast feeding education handouts and information on community breast feeding support.   Outcome: Progressing  Goal: Establishment of adequate milk supply with medication/procedure interruptions  Description: INTERVENTIONS:  - Review techniques for milk expression (breast  pumping).   - Provide pumping equipment/supplies, instructions, and assistance until it is safe to breastfeed infant.  Outcome: Progressing  Goal: Experiences normal breast weaning course  Description: INTERVENTIONS:  - Assess for and manage engorgement.  - Instruct on breast care.  - Provide comfort measures.  Outcome: Progressing  Goal: Appropriate maternal -  bonding  Description: INTERVENTIONS:  - Assess caregiver- interactions.  - Assess caregiver's emotional status and coping mechanisms.  - Encourage caregiver to participate in  daily care.  - Assess support systems available to mother/family.  - Provide /case management support as needed.  Outcome: Progressing

## 2024-09-27 NOTE — PROGRESS NOTES
Pain well controlled, no heavy bleeding or pain    Patient Vitals for the past 24 hrs:   BP Temp Temp src Pulse Resp SpO2   09/27/24 0817 113/76 98.5 °F (36.9 °C) Oral 79 16 --   09/27/24 0000 107/55 98.7 °F (37.1 °C) Oral 68 18 --   09/26/24 2100 112/58 99.4 °F (37.4 °C) Oral 68 -- --   09/26/24 1740 117/66 99.2 °F (37.3 °C) Oral 72 16 --   09/26/24 1540 124/70 100.3 °F (37.9 °C) Oral 71 16 --   09/26/24 1332 99/58 -- -- 77 -- --   09/26/24 1316 105/56 -- -- 79 -- --   09/26/24 1302 99/67 -- -- 83 -- --   09/26/24 1247 103/56 -- -- 88 -- --   09/26/24 1232 107/61 -- -- 95 -- --   09/26/24 1231 -- -- -- 89 -- 100 %   09/26/24 1217 116/61 -- -- 88 -- --   09/26/24 1216 -- -- -- 97 -- 100 %   09/26/24 1206 -- -- -- 92 -- 100 %   09/26/24 1202 108/56 -- -- 96 -- --   09/26/24 1201 -- -- -- 98 -- 100 %   09/26/24 1146 121/58 98.2 °F (36.8 °C) Oral 95 -- --   09/26/24 1141 -- -- -- 85 -- 100 %   09/26/24 1132 104/51 -- -- 85 -- --   09/26/24 1131 -- -- -- 81 -- 100 %   09/26/24 1116 112/61 -- -- 86 -- 99 %   09/26/24 1102 103/57 -- -- 87 -- --   09/26/24 1101 -- -- -- 91 -- 98 %     ABd: ff  EXt: no calf tenderness    Hgb 10.1    PPD#1 stable.

## 2024-09-28 VITALS
RESPIRATION RATE: 16 BRPM | HEART RATE: 60 BPM | SYSTOLIC BLOOD PRESSURE: 105 MMHG | TEMPERATURE: 99 F | BODY MASS INDEX: 24 KG/M2 | DIASTOLIC BLOOD PRESSURE: 65 MMHG | WEIGHT: 145 LBS | OXYGEN SATURATION: 100 %

## 2024-09-28 RX ORDER — IBUPROFEN 600 MG/1
600 TABLET, FILM COATED ORAL EVERY 6 HOURS
Qty: 30 TABLET | Refills: 0 | Status: SHIPPED | OUTPATIENT
Start: 2024-09-28

## 2024-09-28 RX ORDER — ACETAMINOPHEN 500 MG
1000 TABLET ORAL EVERY 6 HOURS
Qty: 30 TABLET | Refills: 0 | Status: SHIPPED | OUTPATIENT
Start: 2024-09-28

## 2024-09-28 NOTE — PROGRESS NOTES
POSTPARTUM PROGRESS NOTE  Date: 2024    Subjective: Patient doing well this morning. Pain is 2/10, improved with motrin. Lochia decreased since yesterday. Voiding freely and has had a bowel movement. Tolerating diet. Breastfeeding without issue. Baby boy is doing well.     Vitals  Vitals:    24 0000 24 0817 24 2125 24 2145   BP: 107/55 113/76 101/49 110/65   BP Location: Right arm Right arm Right arm Right arm   Pulse: 68 79 64 63   Resp: 18 16 16    Temp: 98.7 °F (37.1 °C) 98.5 °F (36.9 °C) 98.9 °F (37.2 °C)    TempSrc: Oral Oral Oral    SpO2:       Weight:             PHYSICAL EXAM  General: resting in bed, no acute distress  Chest: no increasing work of breathing  Abdomen: fundus firm  Genitourinary: moderate lochia  Extremities: lower extremities symmetric bilaterally     A/P: Laquita Vizcarra is a 34 year old  PPD#2 s/p  at 37w2d after spontaneous labor.    #Routine postpartum care  -Pain: well-controlled   -Hemodynamics:   ---acute blood loss with anemia  ---Admission Hgb 11.2 >  > Post-partum Hgb 10.1  ---appropriate postpartum Hgb, further iron supplementation not indicated  -Infant: baby boy doing well, s/p circumcision   -Infant feeding: breast    #Rh negative  -baby boy B+, patient received Rhogam     Disposition: discharge home today    Ashia Madrid DO

## 2024-09-28 NOTE — DISCHARGE SUMMARY
[facility]  part of [facility]    Discharge Summary    Laquita Vizcarra Patient Status:  Inpatient    1990 MRN [id number]   Location [facility] 3SE Attending Desire Noland MD   Hosp Day # 2 PCP Teofilo Sanz MD     Date of Admission: 2024    Date of Discharge: 2024    Admission Diagnoses:   Live IUP at 37w2d  Spontaneous labor     Secondary Diagnosis:   Maternal Rh negative status   Advanced maternal age     Primary OB Clinician: [facility]    Hospital Course:     EDC: Estimated Date of Delivery: 10/15/24    Gestational Age: 37w2d    Date of Delivery: 2024    Antepartum complications: none    Delivered By: Dr. Noland      Delivery Type:     Tubal Ligation: n/a    Baby: Liveborn male,     Apgars:  1 minute:   8                 5 minutes: 9                          Anesthesia: epidural          Intrapartum Complications: None    Laceration: none    Episiotomy: none    Placenta: spontaneous    Feeding Method: breast fed    Rh Immune Globulin Given: yes    Rubella Vaccine Given: no        Discharge Plan:   Discharge Condition: Good  Early Discharge:  NO    Discharge medications:  Current Discharge Medication List        New Orders    Details   acetaminophen 500 MG Oral Tab Take 2 tablets (1,000 mg total) by mouth every 6 (six) hours.      ibuprofen 600 MG Oral Tab Take 1 tablet (600 mg total) by mouth every 6 (six) hours.           Home Meds - Unchanged    Details   prenatal vitamin with DHA 27-0.8-228 MG Oral Cap Take 1 capsule by mouth daily.                   Discharge Diet: As tolerated and General diet    Discharge Activity: Pelvic rest until cleared    Follow up:      Follow-up Information       Desire Noland MD. Schedule an appointment as soon as possible for a visit in 6 week(s).    Specialty: OBSTETRICS & GYNECOLOGY  Why: postpartum visit  Contact information:  [address] [place] [address] [phone]                             Follow  up: 6wk postpartum visit         Other Discharge Instructions:           Post Vaginal Delivery Home Care Instructions     We hope you were pleased with your care at [facility].  We wish you the best outcome and overall experience with the delivery of your baby.  These instructions will help to minimize pain, limit the risk for an infection, and improve the likelihood of a successful recovery.    What to Expect:  Abdominal cramping after delivery especially if you are breastfeeding.   Vaginal bleeding for about 4-6 weeks that may be followed by a yellow or white discharge for a few more weeks.  Your period will resume in approximately 6-8 weeks, unless you are breastfeeding.    If you are bottle feeding, you may notice breast engorgement in about 3 days.  Your breast may be sore and hard. Please wear a tight fitted bra or sports bra for 24-36 hours to help prevent your breast from producing milk, and use ice packs to relive any discomfort.  If you are breastfeeding, nipple dryness is very common the first few days.    Constipation is common after having a baby.  Please increase fluid and fiber in your diet.      Over-The-Counter Medication  Non-prescription anti-inflammatory medications can also help to ease the pain.  You may take Aleve, Tylenol or Ibuprofen   Colace or Metamucil for Constipation  Lanolin for dry nipples  Tucks, Witch Hazel and Epifoam for vaginal/perineum discomfort.   Drink a full glass of water with oral medication and take as directed.    Wound Care  The following instructions will promote proper healing and help to prevent infection  Vaginal/perineum Care: Sitz Bath for 15mins, 2-3 times a day,    Bathing/Showers  You may resume showers  No baths, swimming, hot tubs until your post-partum visit    Home Medication  Resume your home medications as instructed    Diet  Resume your normal diet    Activity  Refrain from vaginal intercourse, vaginal suppositories, tampon use or douches  until after your post-partum visit.  No exercising for 4 weeks  You may climb stairs minimally for the 1st week.    Do not do heavy housework for at least 2-3 weeks    Return to Work or School  You may return to work in approximately 6 weeks  Contact your obstetrician’s office, if you need a medical release. ([phone])    Driving  Avoid driving if you are taking narcotics for pain relief.    Follow-up Appointment with Your Obstetrician  Call your obstetrician’s office today for an appointment in 4-6 weeks.    The number is [phone].  Verify your appointment date, day, time, and location.  At your 1st post-partum office visit:  Your progress will be evaluated, findings reviewed, and any additional concerns and instructions will be discussed.    Questions or Concerns  Call your obstetrician’s office if you experience the following:  Severe pain not controlled by pain medication  Foul smelling vaginal discharge  Heavy bleeding  Shortness of breath  Fever  Redness, increased swelling or drainage from your incision  Crying and periods of sadness that prevents you from caring for yourself and your baby  Burning sensation during urination or inability to urinate  Swelling, redness or abnormal warmth to your leg/calf  Please call ([phone]). If your call is made after office hours, a physician will be available to help you.  There is always a provider covering our patients.    Thank you for coming to [facility] to start your new family.  The nurses and the anesthesiologists try very hard to make sure you receive the best care possible.  Your trust in them as well as us is greatly appreciated.    Thanks so much,   [facility] Obstetrics and Gynecology        Ashia Madrid DO

## 2024-09-28 NOTE — PLAN OF CARE
Problem: Patient Centered Care  Goal: Patient preferences are identified and integrated in the patient's plan of care  Description: Interventions:  - What would you like us to know as we care for you?   - Provide timely, complete, and accurate information to patient/family  - Incorporate patient and family knowledge, values, beliefs, and cultural backgrounds into the planning and delivery of care  - Encourage patient/family to participate in care and decision-making at the level they choose  - Honor patient and family perspectives and choices  Outcome: Completed     Problem: Patient/Family Goals  Goal: Patient/Family Long Term Goal  Description: Patient's Long Term Goal: Patient states she would like to have an uncomplicated delivery.     Interventions:  - Include patient in plan of care  - Timely nursing assessment and education  - See additional Care Plan goals for specific interventions  Outcome: Completed  Goal: Patient/Family Short Term Goal  Description: Patient's Short Term Goal: Patient states she would like to delivery with comfort and pain control.    Interventions:   - Timely nursing education and assessment.  - Include patient in plan of care  - See additional Care Plan goals for specific interventions  Outcome: Completed     Problem: POSTPARTUM  Goal: Long Term Goal:Experiences normal postpartum course  Description: INTERVENTIONS:  - Assess and monitor vital signs and lab values.  - Assess fundus and lochia.  - Provide ice/sitz baths for perineum discomfort.  - Monitor healing of incision/episiotomy/laceration, and assess for signs and symptoms of infection and hematoma.  - Assess bladder function and monitor for bladder distention.  - Provide/instruct/assist with pericare as needed.  - Provide VTE prophylaxis as needed.  - Monitor bowel function.  - Encourage ambulation and provide assistance as needed.  - Assess and monitor emotional status and provide social service/psych resources as needed.  -  Utilize standard precautions and use personal protective equipment as indicated. Ensure aseptic care of all intravenous lines and invasive tubes/drains.  - Obtain immunization and exposure to communicable diseases history.  9/28/2024 1120 by Jody Berger RN  Outcome: Completed  9/28/2024 1120 by Jody Berger, RN  Outcome: Progressing  Goal: Optimize infant feeding at the breast  Description: INTERVENTIONS:  - Initiate breast feeding within first hour after birth.   - Monitor effectiveness of current breast feeding efforts.  - Assess support systems available to mother/family.  - Identify cultural beliefs/practices regarding lactation, letdown techniques, maternal food preferences.  - Assess mother's knowledge and previous experience with breast feeding.  - Provide information as needed about early infant feeding cues (e.g., rooting, lip smacking, sucking fingers/hand) versus late cue of crying.  - Discuss/demonstrate breast feeding aids (e.g., infant sling, nursing footstool/pillows, and breast pumps).  - Encourage mother/other family members to express feelings/concerns, and actively listen.  - Educate father/SO about benefits of breast feeding and how to manage common lactation challenges.  - Recommend avoidance of specific medications or substances incompatible with breast feeding.  - Assess and monitor for signs of nipple pain/trauma.  - Instruct and provide assistance with proper latch.  - Review techniques for milk expression (breast pumping) and storage of breast milk. Provide pumping equipment/supplies, instructions and assistance, as needed.  - Encourage rooming-in and breast feeding on demand.  - Encourage skin-to-skin contact.  - Provide LC support as needed.  - Assess for and manage engorgement.  - Provide breast feeding education handouts and information on community breast feeding support.   9/28/2024 1120 by Jody Berger, RN  Outcome: Completed  9/28/2024 1120 by Jody Berger, RN  Outcome:  Progressing  Goal: Establishment of adequate milk supply with medication/procedure interruptions  Description: INTERVENTIONS:  - Review techniques for milk expression (breast pumping).   - Provide pumping equipment/supplies, instructions, and assistance until it is safe to breastfeed infant.  2024 by Jody Berger RN  Outcome: Completed  2024 by Jody Berger RN  Outcome: Progressing  Goal: Experiences normal breast weaning course  Description: INTERVENTIONS:  - Assess for and manage engorgement.  - Instruct on breast care.  - Provide comfort measures.  Outcome: Completed  Goal: Appropriate maternal -  bonding  Description: INTERVENTIONS:  - Assess caregiver- interactions.  - Assess caregiver's emotional status and coping mechanisms.  - Encourage caregiver to participate in  daily care.  - Assess support systems available to mother/family.  - Provide /case management support as needed.  2024 by Jody Berger, RN  Outcome: Completed  2024 by Jody Berger, RN  Outcome: Progressing

## 2024-10-01 ENCOUNTER — PATIENT OUTREACH (OUTPATIENT)
Dept: CASE MANAGEMENT | Age: 34
End: 2024-10-01

## 2024-10-01 NOTE — PROGRESS NOTES
Called pt to schedule a 6wk post-partum appt :    Delivering Clinician: MARIA A GREENWOOD Delivery Date:9/26/2024 Delivery Type: Normal spontaneous vaginal delivery     Schedule an appointment with Maria A Greenwood as soon as  possible for a visit in 6 week(s)  Specialty: OBSTETRICS & GYNECOLOGY  postpartum visit  [address] [address]  Thursday 11/14 @11am w/ Dr. Greenwood    Confirmed scheduled appt & pt verbalized that she understands & no further assistance needed      Closing encounter

## 2024-10-26 ENCOUNTER — TELEPHONE (OUTPATIENT)
Dept: OBGYN UNIT | Facility: HOSPITAL | Age: 34
End: 2024-10-26

## 2024-11-14 ENCOUNTER — POSTPARTUM (OUTPATIENT)
Dept: OBGYN CLINIC | Facility: CLINIC | Age: 34
End: 2024-11-14
Payer: COMMERCIAL

## 2024-11-14 VITALS
DIASTOLIC BLOOD PRESSURE: 66 MMHG | BODY MASS INDEX: 23.36 KG/M2 | HEIGHT: 65 IN | WEIGHT: 140.19 LBS | SYSTOLIC BLOOD PRESSURE: 112 MMHG

## 2024-11-14 DIAGNOSIS — Z30.09 FAMILY PLANNING COUNSELING: ICD-10-CM

## 2024-11-14 PROBLEM — Z34.90 PREGNANCY (HCC): Status: RESOLVED | Noted: 2024-09-26 | Resolved: 2024-11-14

## 2024-11-14 PROBLEM — Z34.80 SUPERVISION OF OTHER NORMAL PREGNANCY, ANTEPARTUM (HCC): Chronic | Status: RESOLVED | Noted: 2022-06-02 | Resolved: 2024-11-14

## 2024-11-14 PROCEDURE — 3008F BODY MASS INDEX DOCD: CPT | Performed by: OBSTETRICS & GYNECOLOGY

## 2024-11-14 PROCEDURE — 3074F SYST BP LT 130 MM HG: CPT | Performed by: OBSTETRICS & GYNECOLOGY

## 2024-11-14 PROCEDURE — 3078F DIAST BP <80 MM HG: CPT | Performed by: OBSTETRICS & GYNECOLOGY

## 2024-11-14 RX ORDER — ACETAMINOPHEN AND CODEINE PHOSPHATE 120; 12 MG/5ML; MG/5ML
0.35 SOLUTION ORAL DAILY
Qty: 84 TABLET | Refills: 4 | Status: SHIPPED | OUTPATIENT
Start: 2024-11-14 | End: 2025-11-14

## 2024-11-14 NOTE — PROGRESS NOTES
[facility]  Obstetrics and Gynecology   Postpartum Progress Note    Subjective:     Laquita Vizcarra is a 34 year old  female who is s/p  on 24.   Her pregnancy was uncomplicated. She reports doing well. Baby doing well and breast feeding. The patient reports vagina bleeding resolved. The patient denies emotional concerns.     Review of Systems:  General:  denies fevers, chills, fatigue and malaise.   Respiratory:  denies SOB, dyspnea, cough or wheezing  Cardiovascular:  denies chest pain, palpitations, exercise intolerance   GI: denies abdominal pain, diarrhea, constipation  :  denies dysuria, hematuria, increased urinary frequency.  denies abnormal uterine bleeding or vaginal discharge.       Objective:     Vitals:    24 1105   BP: 112/66   Weight: 140 lb 3.2 oz (63.6 kg)   Height: 65\"         Body mass index is 23.33 kg/m².    GENERAL: well developed, well nourished, in no apparent distress, alert and orientated X 3  PSYCH: mood and affect stable   SKIN: no rashes, no lesions  HEENT: normal  LUNGS: respiration unlabored  CARDIOVASCULAR: no peripheral edema or varicosities, skin warm and dry     ABDOMEN: Soft, non distended; non tender, no masses  GYNE/:   External Genitalia: normal, no lesions, good perineal support  Urethra: meatus normal   Bladder: well supported  Vagina: normal mucosa, no lesions,  discharge   Uterus: normal size, mobile, nontender  Cervix: normal os, no lesions or bleeding  Adnexa:normal size, bilaterally nontender, no palpable masses  Cul-de-sac: normal  R/V: normal perineum, no hemorrhoids  EXTREMITIES:  Normal range of motion, strength 5/5, nontender without edema    Labs:       EPDS 0    Assessment:     Laquita Vizcarra is a 34 year old  female who presents for postpartum visit   Patient Active Problem List   Diagnosis    Rh negative state in antepartum period (HCC)    Vaginal bleeding in pregnancy, third trimester (HCC)    Vaginal discharge        ICD-10-CM    1. Encounter for postpartum visit ([facility])  Z39.2       2. Family planning counseling  Z30.09             Plan:     Postpartum exam   - s/p   - doing well,  no complaints   - no abnormal findings on physical exam   - may return to normal activity     Contraception counseling   - discussion held with patient about family planning and contraception  - pt desires POPs for now. Considering tubal sterilization vs vasectomy. Reviewed laparoscopic salpingectomy procedure. Patient will consider and advise if she desires to schedule procedure    All of the findings and plan were discussed with the patient.  She notes understanding and agrees with the plan of care.  All questions were answered to the best of my ability at this time.    RTC in 12 months for well woman exam or sooner if needed     Desire Noland MD

## 2024-11-17 PROBLEM — O47.9 UTERINE CONTRACTIONS (HCC): Status: ACTIVE | Noted: 2024-11-17

## (undated) DIAGNOSIS — U07.1 COVID-19 AFFECTING PREGNANCY, ANTEPARTUM: Primary | ICD-10-CM

## (undated) DIAGNOSIS — O98.519 COVID-19 AFFECTING PREGNANCY, ANTEPARTUM: Primary | ICD-10-CM

## (undated) DIAGNOSIS — Z67.91 BLOOD TYPE, RH NEGATIVE: ICD-10-CM

## (undated) DIAGNOSIS — Z34.81 ENCOUNTER FOR SUPERVISION OF OTHER NORMAL PREGNANCY IN FIRST TRIMESTER: Primary | ICD-10-CM

## (undated) NOTE — MR AVS SNAPSHOT
After Visit Summary   3/6/2024    Laquita Vizcarra   MRN: [id number]           Visit Information     Date & Time  3/6/2024  9:30 AM Provider  Desire Noland MD [facility] - OB/GYN Dept. Phone  [phone]      Your Vitals Were  Most recent update: 3/6/2024  9:36 AM    BP   110/68    Ht   65\"    Wt   127 lb 9.6 oz    LMP   01/09/2024    BMI   21.23 kg/m²         Allergies as of 3/6/2024  Review status set to Review Complete on 3/6/2024   No Known Allergies     Your Current Medications        Dosage    Pyridoxine HCl (VITAMIN B-6) 25 MG Oral Tab Take 1 tablet (25 mg total) by mouth daily.    Doxylamine Succinate, Sleep, 25 MG Oral Tab Take 25 mg by mouth nightly as needed (nausea). Can increase to twice daily if needed for nausea    Multiple Vitamins-Minerals (MULTI-VITAMIN/MINERALS) Oral Tab Take 1 tablet by mouth daily.      Diagnoses for This Visit    Missed menses   [930425]  -  Primary  Pregnancy examination or test, positive result   [V72.42.ICD-9-CM]    Screening for cervical cancer   [119271]    Nausea and vomiting during pregnancy   [[id number]]             Follow-up    Return in about 2 weeks (around 3/20/2024) for RN OB Education Visit.     We Ordered the Following     Normal Orders This Visit    Chlamydia/Gc Amplification [[id number] CUSTOM]     EEH AMB OBGYN IM OB US TRANSVAGINAL (30229) [12164108 CPT(R)]     Hpv Dna  High Risk , Thin Prep Collect [[id number] CUSTOM]     THINPREP PAP SMEAR ONLY [[id number] CUSTOM]     ThinPrep PAP Smear [[id number] CUSTOM]     Urine Culture, Routine [[id number] CUSTOM]     Urine Pregnancy Test [97057 CPT(R)]     Future Labs/Procedures Expected by Expires    Chlamydia/Gc Amplification [[id number] CUSTOM]  3/6/2024 (Approximate) 3/6/2025    Hpv Dna  High Risk , Thin Prep Collect [[id number] CUSTOM]  3/6/2024 3/6/2025    ThinPrep PAP Smear [[id number] CUSTOM]  3/6/2024 3/6/2025    Urine Culture, Routine [[id number] CUSTOM]  3/6/2024  (Approximate) 3/6/2025      Future Appointments        Provider Department    3/20/2024 10:00 AM ASHLEY 10 OBGYN OP NURSE [facility] - OB/GYN      Follow-up Instructions    Return in about 2 weeks (around 3/20/2024) for RN OB Education Visit.                  Did you know that [facility] [place] primary care physicians now offer Video Visits through [employer] for adult patients for a variety of conditions such as allergies, back pain and cold symptoms? Skip the drive and waiting room and online chat with a doctor face-to-face using your web-cam enabled computer or mobile device wherever you are. Video Visits cost $50 and can be paid hassle-free using a credit, debit, or health savings card.  Not active on [employer]? Ask us how to get signed up today!          If you receive a survey from [employer], please take a few minutes to complete it and provide feedback. We strive to deliver the best patient experience and are looking for ways to make improvements. Your feedback will help us do so. For more information on [employer], please visit [email or web address]           No text in SmartText           No text in SmartText

## (undated) NOTE — LETTER
AGREEMENT FOR TREATMENT WITH Rh IMMUNE GLOBULIN          To:    Kay Hou MD (provider) and [address] [place] [address]     Date: March 14, 2022   Time: 5:11 PM    I authorize the administration of Rh Immune Globulin for    [employer] (myself or name of patient) as deemed advisable in the judgment of the attending physician of his associates or assistants. It is understood and agreed that the attending physician or his associates and assistants shall be responsible only for the performance of their own individual professional acts and that the blood typing and the selection of compatible Rh Immune Globulin are the responsibilities of those who actually perform the tests.    It has been fully explained that immunization with Rh Immune Globulin is not always successful in producing the desired result.           _____________________________________________  (Patient or person with authority to consent for patient)      _____________________________________________  (Relationship to patient)      _____________________________________________  (Witness)

## (undated) NOTE — LETTER
AGREEMENT FOR TREATMENT WITH Rh IMMUNE GLOBULIN        To:    Rhonda Younger MD (provider) and [address] [place] [address]     Date: March 14, 2022   Time: 5:12 PM    I authorize the administration of Rh Immune Globulin for    Letitia Richards (myself or name of patient) as deemed advisable in the judgment of the attending physician of his associates or assistants. It is understood and agreed that the attending physician or his associates and assistants shall be responsible only for the performance of their own individual professional acts and that the blood typing and the selection of compatible Rh Immune Globulin are the responsibilities of those who actually perform the tests.    It has been fully explained that immunization with Rh Immune Globulin is not always successful in producing the desired result.             _____________________________________________  (Patient or person with authority to consent for patient)      _____________________________________________  (Relationship to patient)      _____________________________________________  (Witness)

## (undated) NOTE — LETTER
[facility]  Administration of Anesthesia  1. Stephen BOSS or _________________________________ acting on her behalf, (Patient) (Dependent/Representative) request to receive anesthesia for my pending procedure/operation/treatment. A physician (anesthesiologist) alone or an anesthesiologist working with a nurse anesthetist may administer my anesthesia. 2. I understand that my anesthesiologist is not an employee or agent of the hospital, but is an independent medical practitioner who has been permitted to use its facilities for the care and treatment of his/her patients. 3. I acknowledge that a physician from [facility] or their designate(s), recommended anesthesia for me using her/his medical judgment. The type(s) of anesthesia I may receive include:                a) General Anesthesia, b) Spinal/Epidural Anesthesia, c) Regional Anesthesia or d) Monitored Anesthesia Care. 4. If my spinal, regional or monitored anesthesia care (local) is not satisfactory for my comfort, or if my medical condition requires, I consent to the administration of general anesthesia. 5. I am aware that the practice of anesthesiology is not an exact science and that some foreseeable risks or consequences may occur. Some common risks/consequences include sore throat and hoarseness, nausea and vomiting, muscle soreness, backache, damage to the mouth/teeth/vocal cords and eye injury. I understand that more rare but serious potential risks of anesthesia include blood pressure changes, drug reactions, cardiac arrest, brain damage, paralysis or death. These risks apply to whether I have general, spinal/epidural, regional or monitored anesthesia care. 6. OBSTETRIC PATIENTS: Specific risks/consequences of spinal/epidural anesthesia may include itching, low blood pressure, difficulty urinating, slowing of the baby's heart rate and headache.  Rare risks include infections, high spinal block, spinal bleeding, seizure, cardiac arrest and death. 7. AWARENESS: I understand that it is possible (but unlikely) to have explicit memory of events from the operating room while under general anesthesia. 8. ELECTROCONVULSIVE THERAPY PATIENTS: This consent serves for all treatments in a single course of therapy. 9. I understand that I must inform my anesthesiologist when I last ate and/or drank to minimize the risk of anesthesia. 10. If I am pregnant, or may pregnant, I understand that elective surgery should be postponed until after the baby is born. Anesthetics cross the placenta and may temporarily anesthetize the baby. Although fetal complications of anesthesia during pregnancy are rare, they may include birth defects, premature labor, brain damage and death. 11. I certify that I informed the anesthesiologist, to the best of my ability, about medication I take including blood thinners, anticoagulants, herbal remedies, narcotics and recreational drugs (e.g. cocaine, marijuana, PCP). Failure to inform my anesthesiologist about these medicines may increase my risk of anesthetic complications. The nature and purpose of my anesthetic management was explained to me. I had the opportunity to ask questions and the answers and information provided meet my satisfaction.   I retain the right to withdraw this consent at any time prior to the administration of said anesthetic.    ___________________________________________________           _____________________________________________________  Patient Signature                                                                                      Witness Signature                ___________________________________________________           _____________________________________________________  Date/Time                                                                                               Responsible person in case of minor/ unconscious pt /Relationship    My signature below affirms that prior to the time of the procedure, I have explained to the patient and/or his/her guardian, the risks and benefits of undergoing anesthesia, as well as any reasonable alternatives.     ___________________________________________________            _____________________________________________________  Physician Signature                            Date/Time  Patient Name: Don Shah     : 1990     Printed: 2022      Medical Record #: [id number]                              Page 1 of 1    ----------ANESTHESIA CONSENT----------

## (undated) NOTE — LETTER
24    RE: Laquita Vizcarra    : 1990    To Whom It May Concern:    Our patient, Laquita Vizcarra,      _____Has received treatment in our office on ___________________________.        _____Has been hospitalized on the following dates ______________________.       _____ Has been ill and unable to work from _____________________________.        _____ May resume work / school on ___________________________________.      _____ May resume work / school with the following restrictions ______________    __________________________________________________________.      _____ May participate in physical education.      _____ May participate in a prenatal exercise class / yoga class.      ___x__ Patient is pregnant with a due date of Estimated Date of Delivery: 10/15/24      ___x__ Other _Please administer RSV vaccine today. ___________________________________________________     __________________________________________________________       Pat Loaiza MD

## (undated) NOTE — LETTER
:  1990        Partner : Roberth Son   : 1986    To Whom It May Concern:    The partner Roberth Son has been away from work due to his partners recent pregnancy . Baby was delivered  :2024   Work status:  May return to work on 2025  full time with no restrictions     If this office may be of further assistance, please do not hesitate to contact us.      Sincerely,  Desire KATE, CMA

## (undated) NOTE — LETTER
VACCINE ADMINISTRATION RECORD  PARENT / GUARDIAN APPROVAL  Date: 3/30/2022  Vaccine administered to: Stephen Luo     : 1990    MRN: [id number]    A copy of the appropriate Centers for Disease Control and Prevention Vaccine Information statement has been provided. I have read or have had explained the information about the diseases and the vaccines listed below. There was an opportunity to ask questions and any questions were answered satisfactorily. I believe that I understand the benefits and risks of the vaccine cited and ask that the vaccine(s) listed below be given to me or to the person named above (for whom I am authorized to make this request). VACCINES ADMINISTERED:  Tdap    I have read and hereby agree to be bound by the terms of this agreement as stated above. My signature is valid until revoked by me in writing. This document is signed by self, relationship: self on 3/30/2022.:                                                                                                                                         Parent / Amzhaoeo Aves Signature                                                Date    Meseret Kinney served as a witness to authentication that the identity of the person signing electronically is in fact the person represented as signing. This document was generated by Abel Martinez MA on 3/30/2022.

## (undated) NOTE — LETTER
[facility]  Administration of Anesthesia  ILaquita agree to be cared for by a physician anesthesiologist alone and/or with a nurse anesthetist, who is specially trained to monitor me and give me medicine to put me to sleep or keep me comfortable during my procedure    I understand that my anesthesiologist and/or anesthetist is not an employee or agent of [facility] or [employer]. He or she works for [facility]    As the patient asking for anesthesia services, I agree to:  Allow the anesthesiologist (anesthesia doctor) to give me medicine and do additional procedures as necessary. Some examples are: Starting or using an “IV” to give me medicine, fluids or blood during my procedure, and having a breathing tube placed to help me breathe when I’m asleep (intubation). In the event that my heart stops working properly, I understand that my anesthesiologist will make every effort to sustain my life, unless otherwise directed by [facility] Do Not Resuscitate documents.  Tell my anesthesia doctor before my procedure:  If I am pregnant.  The last time that I ate or drank.  iii. All of the medicines I take (including prescriptions, herbal supplements, and pills I can buy without a prescription (including street drugs/illegal medications). Failure to inform my anesthesiologist about these medicines may increase my risk of anesthetic complications.  iv.If I am allergic to anything or have had a reaction to anesthesia before.  I understand how the anesthesia medicine will help me (benefits).  I understand that with any type of anesthesia medicine there are risks:  The most common risks are: nausea, vomiting, sore throat, muscle soreness, damage to my eyes, mouth, or teeth (from breathing tube placement).  Rare risks include: remembering what happened during my procedure, allergic reactions to medications, injury to my airway, heart, lungs, vision, nerves, or muscles  and in extremely rare instances death.  My doctor has explained to me other choices available to me for my care (alternatives).  Pregnant Patients (“epidural”):  I understand that the risks of having an epidural (medicine given into my back to help control pain during labor), include itching, low blood pressure, difficulty urinating, headache or slowing of the baby’s heart. Very rare risks include infection, bleeding, seizure, irregular heart rhythms and nerve injury.  Regional Anesthesia (“spinal”, “epidural”, & “nerve blocks”):  I understand that rare but potential complications include headache, bleeding, infection, seizure, irregular heart rhythms, and nerve injury.    _____________________________________________________________________________  Patient (or Representative) Signature/Relationship to Patient  Date   Time    _____________________________________________________________________________   Name (if used)    Language/Organization   Time    _____________________________________________________________________________  Nurse Anesthetist Signature     Date   Time  _____________________________________________________________________________  Anesthesiologist Signature     Date   Time  I have discussed the procedure and information above with the patient (or patient’s representative) and answered their questions. The patient or their representative has agreed to have anesthesia services.    _____________________________________________________________________________  Witness        Date   Time  I have verified that the signature is that of the patient or patient’s representative, and that it was signed before the procedure  Patient Name: Laquita Vizcarra     : 1990                 Printed: 2024 at 9:28 AM    Medical Record #: [id number]                                            Page 1 of 1  ----------ANESTHESIA CONSENT----------